# Patient Record
Sex: FEMALE | Race: WHITE | NOT HISPANIC OR LATINO | Employment: FULL TIME | URBAN - NONMETROPOLITAN AREA
[De-identification: names, ages, dates, MRNs, and addresses within clinical notes are randomized per-mention and may not be internally consistent; named-entity substitution may affect disease eponyms.]

---

## 2019-10-09 ENCOUNTER — HISTORICAL (OUTPATIENT)
Dept: ADMINISTRATIVE | Facility: HOSPITAL | Age: 44
End: 2019-10-09

## 2020-07-09 ENCOUNTER — HISTORICAL (OUTPATIENT)
Dept: ADMINISTRATIVE | Facility: HOSPITAL | Age: 45
End: 2020-07-09

## 2020-07-12 LAB — FINAL CULTURE: NORMAL

## 2021-04-09 ENCOUNTER — HISTORICAL (OUTPATIENT)
Dept: ADMINISTRATIVE | Facility: HOSPITAL | Age: 46
End: 2021-04-09

## 2022-04-10 ENCOUNTER — HISTORICAL (OUTPATIENT)
Dept: ADMINISTRATIVE | Facility: HOSPITAL | Age: 47
End: 2022-04-10

## 2022-04-24 VITALS
OXYGEN SATURATION: 98 % | WEIGHT: 174 LBS | DIASTOLIC BLOOD PRESSURE: 107 MMHG | HEIGHT: 65 IN | BODY MASS INDEX: 28.99 KG/M2 | SYSTOLIC BLOOD PRESSURE: 179 MMHG

## 2022-05-07 ENCOUNTER — HISTORICAL (OUTPATIENT)
Dept: ADMINISTRATIVE | Facility: HOSPITAL | Age: 47
End: 2022-05-07

## 2022-05-11 ENCOUNTER — HOSPITAL ENCOUNTER (EMERGENCY)
Facility: HOSPITAL | Age: 47
Discharge: HOME | End: 2022-05-12
Attending: EMERGENCY MEDICINE
Payer: COMMERCIAL

## 2022-05-11 VITALS
SYSTOLIC BLOOD PRESSURE: 179 MMHG | DIASTOLIC BLOOD PRESSURE: 103 MMHG | HEART RATE: 92 BPM | RESPIRATION RATE: 20 BRPM | TEMPERATURE: 98 F | OXYGEN SATURATION: 99 %

## 2022-05-11 DIAGNOSIS — R10.9 ABDOMINAL PAIN: ICD-10-CM

## 2022-05-11 DIAGNOSIS — R11.10 VOMITING, INTRACTABILITY OF VOMITING NOT SPECIFIED, PRESENCE OF NAUSEA NOT SPECIFIED, UNSPECIFIED VOMITING TYPE: ICD-10-CM

## 2022-05-11 DIAGNOSIS — K29.00 ACUTE GASTRITIS, PRESENCE OF BLEEDING UNSPECIFIED, UNSPECIFIED GASTRITIS TYPE: Primary | ICD-10-CM

## 2022-05-11 LAB
ALBUMIN SERPL-MCNC: 4.5 GM/DL (ref 3.5–5)
ALBUMIN/GLOB SERPL: 1.6 RATIO (ref 1.1–2)
ALP SERPL-CCNC: 64 UNIT/L (ref 40–150)
ALT SERPL-CCNC: 17 UNIT/L (ref 0–55)
APPEARANCE UR: CLEAR
AST SERPL-CCNC: 15 UNIT/L (ref 5–34)
B-HCG SERPL QL: NEGATIVE
BACTERIA #/AREA URNS AUTO: ABNORMAL /HPF
BASOPHILS # BLD AUTO: 0.03 X10(3)/MCL (ref 0–0.2)
BASOPHILS NFR BLD AUTO: 0.2 %
BILIRUB UR QL STRIP.AUTO: NEGATIVE MG/DL
BILIRUBIN DIRECT+TOT PNL SERPL-MCNC: 0.4 MG/DL (ref 0–0.5)
BILIRUBIN DIRECT+TOT PNL SERPL-MCNC: 0.8 MG/DL (ref 0–0.8)
BILIRUBIN DIRECT+TOT PNL SERPL-MCNC: 1.2 MG/DL
BUN SERPL-MCNC: 12.2 MG/DL (ref 7–18.7)
CALCIUM SERPL-MCNC: 9.9 MG/DL (ref 8.4–10.2)
CAOX CRY URNS QL MICRO: ABNORMAL /HPF
CHLORIDE SERPL-SCNC: 92 MMOL/L (ref 98–107)
CO2 SERPL-SCNC: 25 MMOL/L (ref 22–29)
COLOR UR AUTO: YELLOW
CREAT SERPL-MCNC: 0.65 MG/DL (ref 0.55–1.02)
EOSINOPHIL # BLD AUTO: 0 X10(3)/MCL (ref 0–0.9)
EOSINOPHIL NFR BLD AUTO: 0 %
ERYTHROCYTE [DISTWIDTH] IN BLOOD BY AUTOMATED COUNT: 12.4 % (ref 11.5–17)
GLOBULIN SER-MCNC: 2.8 GM/DL (ref 2.4–3.5)
GLUCOSE SERPL-MCNC: 97 MG/DL (ref 74–100)
GLUCOSE UR QL STRIP.AUTO: NEGATIVE MG/DL
HCT VFR BLD AUTO: 44.5 % (ref 37–47)
HGB BLD-MCNC: 15.4 GM/DL (ref 12–16)
IMM GRANULOCYTES # BLD AUTO: 0.08 X10(3)/MCL (ref 0–0.02)
IMM GRANULOCYTES NFR BLD AUTO: 0.5 % (ref 0–0.43)
KETONES UR QL STRIP.AUTO: ABNORMAL MG/DL
LEUKOCYTE ESTERASE UR QL STRIP.AUTO: NEGATIVE UNIT/L
LIPASE SERPL-CCNC: 28 U/L
LYMPHOCYTES # BLD AUTO: 2.32 X10(3)/MCL (ref 0.6–4.6)
LYMPHOCYTES NFR BLD AUTO: 14.7 %
MCH RBC QN AUTO: 30.3 PG (ref 27–31)
MCHC RBC AUTO-ENTMCNC: 34.6 MG/DL (ref 33–36)
MCV RBC AUTO: 87.4 FL (ref 80–94)
MONOCYTES # BLD AUTO: 1.25 X10(3)/MCL (ref 0.1–1.3)
MONOCYTES NFR BLD AUTO: 7.9 %
NEUTROPHILS # BLD AUTO: 12.1 X10(3)/MCL (ref 2.1–9.2)
NEUTROPHILS NFR BLD AUTO: 76.7 %
NITRITE UR QL STRIP.AUTO: NEGATIVE
NRBC BLD AUTO-RTO: 0 %
PH UR STRIP.AUTO: 7.5 [PH]
PLATELET # BLD AUTO: 516 X10(3)/MCL (ref 130–400)
PMV BLD AUTO: 8.6 FL (ref 9.4–12.4)
POTASSIUM SERPL-SCNC: 3.3 MMOL/L (ref 3.5–5.1)
PROT SERPL-MCNC: 7.3 GM/DL (ref 6.4–8.3)
PROT UR QL STRIP.AUTO: ABNORMAL MG/DL
RBC # BLD AUTO: 5.09 X10(6)/MCL (ref 4.2–5.4)
RBC #/AREA URNS AUTO: ABNORMAL /HPF
RBC UR QL AUTO: NEGATIVE UNIT/L
SODIUM SERPL-SCNC: 130 MMOL/L (ref 136–145)
SP GR UR STRIP.AUTO: 1.01 (ref 1–1.03)
SQUAMOUS #/AREA URNS AUTO: ABNORMAL /LPF
UROBILINOGEN UR STRIP-ACNC: 1 MG/DL
WBC # SPEC AUTO: 15.8 X10(3)/MCL (ref 4.5–11.5)
WBC #/AREA URNS AUTO: ABNORMAL /HPF
YEAST URNS QL MICRO: ABNORMAL /HPF

## 2022-05-11 PROCEDURE — 25000003 PHARM REV CODE 250: Performed by: EMERGENCY MEDICINE

## 2022-05-11 PROCEDURE — 81015 MICROSCOPIC EXAM OF URINE: CPT | Performed by: STUDENT IN AN ORGANIZED HEALTH CARE EDUCATION/TRAINING PROGRAM

## 2022-05-11 PROCEDURE — 81025 URINE PREGNANCY TEST: CPT | Performed by: EMERGENCY MEDICINE

## 2022-05-11 PROCEDURE — 99285 EMERGENCY DEPT VISIT HI MDM: CPT | Mod: 25

## 2022-05-11 PROCEDURE — 93010 EKG 12-LEAD: ICD-10-PCS | Mod: ,,, | Performed by: INTERNAL MEDICINE

## 2022-05-11 PROCEDURE — 85025 COMPLETE CBC W/AUTO DIFF WBC: CPT | Performed by: STUDENT IN AN ORGANIZED HEALTH CARE EDUCATION/TRAINING PROGRAM

## 2022-05-11 PROCEDURE — 83690 ASSAY OF LIPASE: CPT | Performed by: EMERGENCY MEDICINE

## 2022-05-11 PROCEDURE — 93010 ELECTROCARDIOGRAM REPORT: CPT | Mod: ,,, | Performed by: INTERNAL MEDICINE

## 2022-05-11 PROCEDURE — 36415 COLL VENOUS BLD VENIPUNCTURE: CPT | Performed by: STUDENT IN AN ORGANIZED HEALTH CARE EDUCATION/TRAINING PROGRAM

## 2022-05-11 PROCEDURE — 81003 URINALYSIS AUTO W/O SCOPE: CPT | Performed by: STUDENT IN AN ORGANIZED HEALTH CARE EDUCATION/TRAINING PROGRAM

## 2022-05-11 PROCEDURE — 96374 THER/PROPH/DIAG INJ IV PUSH: CPT

## 2022-05-11 PROCEDURE — 96372 THER/PROPH/DIAG INJ SC/IM: CPT | Performed by: EMERGENCY MEDICINE

## 2022-05-11 PROCEDURE — 80053 COMPREHEN METABOLIC PANEL: CPT | Performed by: STUDENT IN AN ORGANIZED HEALTH CARE EDUCATION/TRAINING PROGRAM

## 2022-05-11 PROCEDURE — 63600175 PHARM REV CODE 636 W HCPCS: Performed by: EMERGENCY MEDICINE

## 2022-05-11 PROCEDURE — C9113 INJ PANTOPRAZOLE SODIUM, VIA: HCPCS | Performed by: EMERGENCY MEDICINE

## 2022-05-11 PROCEDURE — 93005 ELECTROCARDIOGRAM TRACING: CPT

## 2022-05-11 RX ORDER — ESOMEPRAZOLE MAGNESIUM 40 MG/1
40 CAPSULE, DELAYED RELEASE ORAL DAILY
Qty: 30 CAPSULE | Refills: 0 | Status: SHIPPED | OUTPATIENT
Start: 2022-05-11 | End: 2023-05-11

## 2022-05-11 RX ORDER — ONDANSETRON 8 MG/1
8 TABLET, ORALLY DISINTEGRATING ORAL EVERY 6 HOURS PRN
Qty: 12 TABLET | Refills: 1 | Status: SHIPPED | OUTPATIENT
Start: 2022-05-11

## 2022-05-11 RX ORDER — PANTOPRAZOLE SODIUM 40 MG/10ML
40 INJECTION, POWDER, LYOPHILIZED, FOR SOLUTION INTRAVENOUS
Status: COMPLETED | OUTPATIENT
Start: 2022-05-11 | End: 2022-05-11

## 2022-05-11 RX ORDER — ACETAMINOPHEN 500 MG
1000 TABLET ORAL
Status: COMPLETED | OUTPATIENT
Start: 2022-05-11 | End: 2022-05-11

## 2022-05-11 RX ORDER — DICYCLOMINE HYDROCHLORIDE 10 MG/ML
20 INJECTION INTRAMUSCULAR
Status: COMPLETED | OUTPATIENT
Start: 2022-05-11 | End: 2022-05-11

## 2022-05-11 RX ORDER — SUCRALFATE 1 G/1
1 TABLET ORAL 4 TIMES DAILY
Qty: 40 TABLET | Refills: 0 | Status: SHIPPED | OUTPATIENT
Start: 2022-05-11 | End: 2022-05-21

## 2022-05-11 RX ORDER — SODIUM CHLORIDE 9 MG/ML
1000 INJECTION, SOLUTION INTRAVENOUS
Status: COMPLETED | OUTPATIENT
Start: 2022-05-11 | End: 2022-05-11

## 2022-05-11 RX ADMIN — ACETAMINOPHEN 1000 MG: 500 TABLET ORAL at 05:05

## 2022-05-11 RX ADMIN — DICYCLOMINE HYDROCHLORIDE 20 MG: 20 INJECTION, SOLUTION INTRAMUSCULAR at 11:05

## 2022-05-11 RX ADMIN — PANTOPRAZOLE SODIUM 40 MG: 40 INJECTION, POWDER, FOR SOLUTION INTRAVENOUS at 11:05

## 2022-05-11 RX ADMIN — SODIUM CHLORIDE 1000 ML: 9 INJECTION, SOLUTION INTRAVENOUS at 11:05

## 2022-05-11 NOTE — ED PROVIDER NOTES
Encounter Date: 5/11/2022       History     Chief Complaint   Patient presents with    Abdominal Pain     Pt to ER via POV for abd pain.  Pt states also n/v.  Pt states this is her 3rd visit to the ER this week for the same complaint (seen twice in [place]).  Has appt with GI 5/20.  Pt very anxious, crying in triage    Nausea    Vomiting     This patient presents with a one-week history of upper abdominal pain, with associated nausea vomiting.  Over the last week she has had 3-4 episodes of this pain with associated nausea vomiting that lasted several hours.  She has been to an outside emergency department twice and follow up with her primary care physician.  She had similar symptoms several years ago, was diagnosed with gastritis, and improved with symptomatic treatment.  She states she has a decreased appetite as well because of the persistent nausea vomiting and pain.  Patient states that after a while the pain radiates into her lower chest after vomiting.  Denies bloody emesis.  Had a bowel movement yesterday.  At the outside emergency department had ultrasound, along with x-rays and labs.  Patient placed on antibiotics.  Patient has GI follow-up but not for another 10 days.    The history is provided by the patient.   Abdominal Pain  The current episode started several days ago. The onset of the illness was abrupt. The problem has not changed since onset.The abdominal pain is located in the epigastric region. The abdominal pain radiates to the LLQ, LUQ, RUQ and RLQ. The severity of the abdominal pain is 7/10. The abdominal pain is relieved by nothing. The other symptoms of the illness include nausea and vomiting. The other symptoms of the illness do not include fever, shortness of breath or dysuria.   Nausea began 6 to 7 days ago. The nausea is associated with eating.   The vomiting began more than 2 days ago. The emesis contains stomach contents.   The patient states that she believes she is currently not  pregnant. The patient has not had a change in bowel habit. Additional symptoms associated with the illness include anorexia. Symptoms associated with the illness do not include chills, constipation, hematuria, frequency or back pain.   Nausea  Associated symptoms include abdominal pain. Pertinent negatives include no chest pain and no shortness of breath.   Vomiting   Associated symptoms include abdominal pain. Pertinent negatives include no chills and no fever.     Review of patient's allergies indicates:  No Known Allergies  Past Medical History:   Diagnosis Date    Fibromyalgia syndrome      Past Surgical History:   Procedure Laterality Date    SINUS SURGERY       History reviewed. No pertinent family history.     Review of Systems   Constitutional: Negative for chills and fever.   HENT: Negative for sore throat.    Respiratory: Negative for shortness of breath.    Cardiovascular: Negative for chest pain.   Gastrointestinal: Positive for abdominal pain, anorexia, nausea and vomiting. Negative for constipation.   Genitourinary: Negative for dysuria, frequency and hematuria.   Musculoskeletal: Negative for back pain.   Skin: Negative for rash.   Neurological: Negative for weakness.   Hematological: Does not bruise/bleed easily.       Physical Exam     Initial Vitals [05/11/22 0806]   BP Pulse Resp Temp SpO2   (!) 163/95 (!) 121 20 98.4 °F (36.9 °C) 96 %      MAP       --         Physical Exam    Nursing note and vitals reviewed.  Constitutional: She appears well-developed and well-nourished.   HENT:   Head: Normocephalic and atraumatic.   Mouth/Throat: Oropharynx is clear and moist.   Eyes: Pupils are equal, round, and reactive to light.   Neck: Neck supple.   Normal range of motion.  Cardiovascular: Regular rhythm, normal heart sounds and intact distal pulses.   Tachycardic   Pulmonary/Chest: Breath sounds normal.   Abdominal: Abdomen is soft. Bowel sounds are normal. There is no abdominal tenderness. There is  no rebound.   Musculoskeletal:         General: No tenderness. Normal range of motion.      Cervical back: Normal range of motion and neck supple.     Neurological: She is alert and oriented to person, place, and time. She has normal strength. No sensory deficit. GCS score is 15. GCS eye subscore is 4. GCS verbal subscore is 5. GCS motor subscore is 6.   Skin: Skin is warm and dry.   Psychiatric: She has a normal mood and affect.         ED Course   Procedures  Labs Reviewed   COMPREHENSIVE METABOLIC PANEL - Abnormal; Notable for the following components:       Result Value    Sodium Level 130 (*)     Potassium Level 3.3 (*)     Chloride 92 (*)     All other components within normal limits   URINALYSIS, REFLEX TO URINE CULTURE - Abnormal; Notable for the following components:    Protein, UA Trace (*)     Ketones, UA 2+ (*)     All other components within normal limits   CBC WITH DIFFERENTIAL - Abnormal; Notable for the following components:    WBC 15.8 (*)     Platelet 516 (*)     MPV 8.6 (*)     Neut # 12.1 (*)     IG# 0.08 (*)     IG% 0.5 (*)     All other components within normal limits   URINALYSIS, MICROSCOPIC - Abnormal; Notable for the following components:    Yeast, UA Moderate (*)     Calcium Oxalate Crystals, UA Moderate (*)     All other components within normal limits   LIPASE - Normal   HCG QUALITATIVE URINE - Normal   CBC W/ AUTO DIFFERENTIAL    Narrative:     The following orders were created for panel order CBC auto differential.  Procedure                               Abnormality         Status                     ---------                               -----------         ------                     CBC with Differential[[id number]]        Abnormal            Final result                 Please view results for these tests on the individual orders.     EKG Readings: (Independently Interpreted)   Initial Reading: No STEMI. Rhythm: Normal Sinus Rhythm. Heart Rate: 78. Ectopy: No Ectopy. ST Segments:  Normal ST Segments. T Waves: Normal. Clinical Impression: Left Ventricular Hypertrophy (LDH)   Time 11:40 a.m.     ECG Results          EKG 12-lead (In process)  Result time 05/11/22 11:49:43    In process by Interface, Lab In [facility] (05/11/22 11:49:43)                 Narrative:    Test Reason : R10.9,    Vent. Rate : 081 BPM     Atrial Rate : 081 BPM     P-R Int : 134 ms          QRS Dur : 072 ms      QT Int : 422 ms       P-R-T Axes : 016 052 063 degrees     QTc Int : 490 ms    Normal sinus rhythm  Minimal voltage criteria for LVH, may be normal variant ( Sokolow-Gresham )  Prolonged QT  Abnormal ECG  No previous ECGs available    Referred By: DEONDRE NICOLE           Confirmed By:                             Imaging Results          CT Abdomen Pelvis  Without Contrast (Final result)  Result time 05/11/22 16:29:46    Final result by Emmanuel Garcia MD (05/11/22 16:29:46)                 Impression:      1. No acute inflammatory abdominopelvic pathology.  No mechanical bowel obstruction.  No nephroureterolithiasis or hydroureteronephrosis.  2. Nonacute and incidental secondary findings, as detailed above.      Electronically signed by: Emmanuel Garcia  Date:    05/11/2022  Time:    16:29             Narrative:    EXAMINATION:  CT ABDOMEN PELVIS WITHOUT CONTRAST    CLINICAL HISTORY:  Abdominal pain, acute, nonlocalized;    TECHNIQUE:  Multiple axial CT images were obtained from the lung bases through the symphysis pubis without the administration of intravenous contrast and reformatted in the coronal and sagittal planes. Total  mGy*cm. Automated exposure control was utilized for this examination as a radiation dose lowering technique.    COMPARISON:  None available.    FINDINGS:  Inferior heart size is normal.  No pericardial effusion.    The imaged lung bases are without focal consolidation, suspicious mass or nodule, pleural effusion, or pneumothorax.    The liver is normal in size and attenuation.  No focal  suspicious hepatic mass lesions are identified on this noncontrast examination.  No intrahepatic/extrahepatic biliary ductal dilatation.  No radiopaque cholelithiasis or pericholecystic inflammation.  No peripancreatic inflammation or pancreatic ductal dilatation.  Focal punctate calcification within the pancreatic uncinate process.  The spleen is within normal limits.    The adrenal glands are without suspicious mass or nodule.    Mild renal size discrepancy, left larger than right.  No suspicious cystic or solid renal mass lesions are identified on this noncontrast examination.  No nephroureterolithiasis, hydroureteronephrosis, or perinephric/periureteral inflammation.  The bilateral ureters are unobstructed to a moderately distended urinary bladder, which is without suspicious wall thickening or intraluminal mass lesion.    No suspicious adnexal mass lesions are identified on this noncontrast examination.  Foreign body within the vaginal canal is presumed represent a pessary.  The uterus, adnexa, vagina, and perineum are otherwise within normal limits.    The small and large bowel are without mechanical obstruction or focal inflammation.  The appendix and terminal ileum are normal in appearance.  The small bowel is nondilated.  No pathologically enlarged lymph nodes are identified within the abdomen and pelvis on this noncontrast examination.  No free fluid or free air.    The abdominopelvic arteriovascular structures are nonaneurysmal.    Foci of subcutaneous soft tissue gas within the left flank are presumed to represent sites of medication injection.  The soft tissues are otherwise without acute process.    The osseous structures are without acute fracture, dislocation, or aggressive appearing bone lesion.  Minimal lower thoracic and lumbosacral spondylosis.  Bilateral L5 pars interarticularis defects without associated spondylolisthesis.                                 Medications   0.9%  NaCl infusion (1,000  mLs Intravenous New Bag 5/11/22 1125)   pantoprazole injection 40 mg (40 mg Intravenous Given 5/11/22 1125)   dicyclomine injection 20 mg (20 mg Intramuscular Given 5/11/22 1129)   acetaminophen tablet 1,000 mg (1,000 mg Oral Given 5/11/22 1745)     Medical Decision Making:   History:   Old Medical Records: I decided to obtain old medical records.  Initial Assessment:   Patient's abdomen is soft and nontender.  She states this feels similar to the symptoms in 2018 where she was diagnosed with gastritis.  She is only treated with antiemetics, this episode.  She states the last time this occurred she improved after the 1 visit to the emergency department.  Differential Diagnosis:   Gastritis, peptic ulcer disease, pancreatitis, biliary colic, ileus, medication reaction             ED Course as of 05/11/22 1738   Wed May 11, 2022   1731 Patient with above workup, except for mild leukocytosis otherwise unremarkable.  Patient feels better after medications in the ED.  She again states that this is very similar symptoms to which she had previously that she responded very well to outpatient management.  She understands the need to return if any worsening or change in her symptoms.  She does have GI follow-up as well. [MP]      ED Course User Index  [MP] Victorino Martinez MD             Clinical Impression:   Final diagnoses:  [R10.9] Abdominal pain  [K29.00] Acute gastritis, presence of bleeding unspecified, unspecified gastritis type (Primary)  [R11.10] Vomiting, intractability of vomiting not specified, presence of nausea not specified, unspecified vomiting type          ED Disposition Condition    Discharge Stable        ED Prescriptions     Medication Sig Dispense Start Date End Date Auth. Provider    esomeprazole (NEXIUM) 40 MG capsule Take 1 capsule (40 mg total) by mouth once daily. 30 capsule 5/11/2022 5/11/2023 Victorino Martinez MD    sucralfate (CARAFATE) 1 gram tablet Take 1 tablet (1 g total) by mouth 4  (four) times daily. for 10 days 40 tablet 5/11/2022 5/21/2022 Victorino Martinez MD    ondansetron (ZOFRAN-ODT) 8 MG TbDL Take 1 tablet (8 mg total) by mouth every 6 (six) hours as needed (nausea/vomiting). 12 tablet 5/11/2022  Victorino Martinez MD        Follow-up Information     Follow up With Specialties Details Why Contact Info    Devendra Grace MD Gastroenterology  Keep scheduled appointment [address].  [facility] [id number]  [phone]      [facility] - Emergency Dept Emergency Medicine Go to  If symptoms worsen, As needed [address] [facility] [place] [id number] [phone]           Victorino Martinez MD  05/11/22 3572

## 2022-05-12 ENCOUNTER — HOSPITAL ENCOUNTER (EMERGENCY)
Facility: HOSPITAL | Age: 47
Discharge: HOME | End: 2022-05-12
Attending: STUDENT IN AN ORGANIZED HEALTH CARE EDUCATION/TRAINING PROGRAM
Payer: COMMERCIAL

## 2022-05-12 VITALS
TEMPERATURE: 98 F | RESPIRATION RATE: 23 BRPM | HEIGHT: 65 IN | DIASTOLIC BLOOD PRESSURE: 82 MMHG | OXYGEN SATURATION: 98 % | WEIGHT: 165 LBS | BODY MASS INDEX: 27.49 KG/M2 | SYSTOLIC BLOOD PRESSURE: 158 MMHG | HEART RATE: 89 BPM

## 2022-05-12 DIAGNOSIS — R11.2 NON-INTRACTABLE VOMITING WITH NAUSEA, UNSPECIFIED VOMITING TYPE: Primary | ICD-10-CM

## 2022-05-12 LAB
ALBUMIN SERPL-MCNC: 4.3 GM/DL (ref 3.5–5)
ALBUMIN/GLOB SERPL: 1.3 RATIO (ref 1.1–2)
ALP SERPL-CCNC: 60 UNIT/L (ref 40–150)
ALT SERPL-CCNC: 18 UNIT/L (ref 0–55)
AST SERPL-CCNC: 19 UNIT/L (ref 5–34)
BASOPHILS # BLD AUTO: 0.04 X10(3)/MCL (ref 0–0.2)
BASOPHILS NFR BLD AUTO: 0.4 %
BILIRUBIN DIRECT+TOT PNL SERPL-MCNC: 1.3 MG/DL
BUN SERPL-MCNC: 7 MG/DL (ref 7–18.7)
CALCIUM SERPL-MCNC: 9.3 MG/DL (ref 8.4–10.2)
CHLORIDE SERPL-SCNC: 96 MMOL/L (ref 98–107)
CO2 SERPL-SCNC: 26 MMOL/L (ref 22–29)
CREAT SERPL-MCNC: 0.67 MG/DL (ref 0.55–1.02)
EOSINOPHIL # BLD AUTO: 0.01 X10(3)/MCL (ref 0–0.9)
EOSINOPHIL NFR BLD AUTO: 0.1 %
ERYTHROCYTE [DISTWIDTH] IN BLOOD BY AUTOMATED COUNT: 12.4 % (ref 11.5–17)
GLOBULIN SER-MCNC: 3.4 GM/DL (ref 2.4–3.5)
GLUCOSE SERPL-MCNC: 99 MG/DL (ref 74–100)
HCT VFR BLD AUTO: 42.8 % (ref 37–47)
HGB BLD-MCNC: 14.9 GM/DL (ref 12–16)
IMM GRANULOCYTES # BLD AUTO: 0.05 X10(3)/MCL (ref 0–0.02)
IMM GRANULOCYTES NFR BLD AUTO: 0.5 % (ref 0–0.43)
LIPASE SERPL-CCNC: 21 U/L
LYMPHOCYTES # BLD AUTO: 2.04 X10(3)/MCL (ref 0.6–4.6)
LYMPHOCYTES NFR BLD AUTO: 18.9 %
MAGNESIUM SERPL-MCNC: 1.6 MG/DL (ref 1.6–2.6)
MCH RBC QN AUTO: 30.5 PG (ref 27–31)
MCHC RBC AUTO-ENTMCNC: 34.8 MG/DL (ref 33–36)
MCV RBC AUTO: 87.7 FL (ref 80–94)
MONOCYTES # BLD AUTO: 1.12 X10(3)/MCL (ref 0.1–1.3)
MONOCYTES NFR BLD AUTO: 10.4 %
NEUTROPHILS # BLD AUTO: 7.5 X10(3)/MCL (ref 2.1–9.2)
NEUTROPHILS NFR BLD AUTO: 69.7 %
NRBC BLD AUTO-RTO: 0 %
PLATELET # BLD AUTO: 474 X10(3)/MCL (ref 130–400)
PMV BLD AUTO: 8.7 FL (ref 9.4–12.4)
POTASSIUM SERPL-SCNC: 2.9 MMOL/L (ref 3.5–5.1)
PROT SERPL-MCNC: 7.7 GM/DL (ref 6.4–8.3)
RBC # BLD AUTO: 4.88 X10(6)/MCL (ref 4.2–5.4)
SODIUM SERPL-SCNC: 135 MMOL/L (ref 136–145)
WBC # SPEC AUTO: 10.8 X10(3)/MCL (ref 4.5–11.5)

## 2022-05-12 PROCEDURE — 96361 HYDRATE IV INFUSION ADD-ON: CPT

## 2022-05-12 PROCEDURE — 80053 COMPREHEN METABOLIC PANEL: CPT | Performed by: NURSE PRACTITIONER

## 2022-05-12 PROCEDURE — 96372 THER/PROPH/DIAG INJ SC/IM: CPT | Mod: 59 | Performed by: NURSE PRACTITIONER

## 2022-05-12 PROCEDURE — 96375 TX/PRO/DX INJ NEW DRUG ADDON: CPT

## 2022-05-12 PROCEDURE — 83690 ASSAY OF LIPASE: CPT | Performed by: NURSE PRACTITIONER

## 2022-05-12 PROCEDURE — 63600175 PHARM REV CODE 636 W HCPCS: Performed by: PHYSICIAN ASSISTANT

## 2022-05-12 PROCEDURE — 36415 COLL VENOUS BLD VENIPUNCTURE: CPT | Performed by: NURSE PRACTITIONER

## 2022-05-12 PROCEDURE — 85025 COMPLETE CBC W/AUTO DIFF WBC: CPT | Performed by: NURSE PRACTITIONER

## 2022-05-12 PROCEDURE — 63600175 PHARM REV CODE 636 W HCPCS: Performed by: NURSE PRACTITIONER

## 2022-05-12 PROCEDURE — 96366 THER/PROPH/DIAG IV INF ADDON: CPT

## 2022-05-12 PROCEDURE — 96365 THER/PROPH/DIAG IV INF INIT: CPT

## 2022-05-12 PROCEDURE — 83735 ASSAY OF MAGNESIUM: CPT | Performed by: PHYSICIAN ASSISTANT

## 2022-05-12 PROCEDURE — 99284 EMERGENCY DEPT VISIT MOD MDM: CPT | Mod: 25

## 2022-05-12 PROCEDURE — 25000003 PHARM REV CODE 250: Performed by: PHYSICIAN ASSISTANT

## 2022-05-12 PROCEDURE — C9113 INJ PANTOPRAZOLE SODIUM, VIA: HCPCS | Performed by: PHYSICIAN ASSISTANT

## 2022-05-12 RX ORDER — MAGNESIUM SULFATE HEPTAHYDRATE 40 MG/ML
2 INJECTION, SOLUTION INTRAVENOUS
Status: COMPLETED | OUTPATIENT
Start: 2022-05-12 | End: 2022-05-12

## 2022-05-12 RX ORDER — PROMETHAZINE HYDROCHLORIDE 25 MG/1
25 SUPPOSITORY RECTAL EVERY 6 HOURS PRN
Qty: 15 SUPPOSITORY | Refills: 0 | Status: SHIPPED | OUTPATIENT
Start: 2022-05-12 | End: 2023-06-03 | Stop reason: SDUPTHER

## 2022-05-12 RX ORDER — ONDANSETRON 2 MG/ML
4 INJECTION INTRAMUSCULAR; INTRAVENOUS ONCE
Status: DISCONTINUED | OUTPATIENT
Start: 2022-05-12 | End: 2022-05-12

## 2022-05-12 RX ORDER — DICYCLOMINE HYDROCHLORIDE 10 MG/ML
20 INJECTION INTRAMUSCULAR ONCE
Status: COMPLETED | OUTPATIENT
Start: 2022-05-12 | End: 2022-05-12

## 2022-05-12 RX ORDER — PANTOPRAZOLE SODIUM 40 MG/10ML
40 INJECTION, POWDER, LYOPHILIZED, FOR SOLUTION INTRAVENOUS
Status: COMPLETED | OUTPATIENT
Start: 2022-05-12 | End: 2022-05-12

## 2022-05-12 RX ORDER — DROPERIDOL 2.5 MG/ML
1.25 INJECTION, SOLUTION INTRAMUSCULAR; INTRAVENOUS ONCE
Status: COMPLETED | OUTPATIENT
Start: 2022-05-12 | End: 2022-05-12

## 2022-05-12 RX ORDER — METOCLOPRAMIDE 10 MG/1
10 TABLET ORAL EVERY 6 HOURS
Qty: 30 TABLET | Refills: 0 | Status: SHIPPED | OUTPATIENT
Start: 2022-05-12

## 2022-05-12 RX ORDER — POTASSIUM CHLORIDE 20 MEQ/1
20 TABLET, EXTENDED RELEASE ORAL 2 TIMES DAILY
Qty: 6 TABLET | Refills: 0 | Status: SHIPPED | OUTPATIENT
Start: 2022-05-12 | End: 2022-05-15

## 2022-05-12 RX ORDER — POTASSIUM CHLORIDE 20 MEQ/1
20 TABLET, EXTENDED RELEASE ORAL
Status: COMPLETED | OUTPATIENT
Start: 2022-05-12 | End: 2022-05-12

## 2022-05-12 RX ADMIN — SODIUM CHLORIDE, POTASSIUM CHLORIDE, SODIUM LACTATE AND CALCIUM CHLORIDE 1000 ML: 600; 310; 30; 20 INJECTION, SOLUTION INTRAVENOUS at 01:05

## 2022-05-12 RX ADMIN — PANTOPRAZOLE SODIUM 40 MG: 40 INJECTION, POWDER, FOR SOLUTION INTRAVENOUS at 03:05

## 2022-05-12 RX ADMIN — MAGNESIUM SULFATE HEPTAHYDRATE 2 G: 40 INJECTION, SOLUTION INTRAVENOUS at 05:05

## 2022-05-12 RX ADMIN — POTASSIUM CHLORIDE 20 MEQ: 20 TABLET, EXTENDED RELEASE ORAL at 03:05

## 2022-05-12 RX ADMIN — DROPERIDOL 1.25 MG: 2.5 INJECTION, SOLUTION INTRAMUSCULAR; INTRAVENOUS at 01:05

## 2022-05-12 RX ADMIN — DICYCLOMINE HYDROCHLORIDE 20 MG: 20 INJECTION, SOLUTION INTRAMUSCULAR at 01:05

## 2022-05-12 NOTE — ED PROVIDER NOTES
Encounter Date: 5/12/2022       History     Chief Complaint   Patient presents with    Emesis     Pt presents to er c/o n/v x 1 week.  States has had 4 ED visits in the last week.  States taking medications at home but not working.  States can't keep anything down.  Pt also c/o abd pain.  Pt is aaox4 amulated into room with steady gait.      47 yo female with hx of gastritis presents to ED for evaluation of upper abdominal pain with nausea and vomiting for the past week. Patient reports this is her 4th visit to the ED this week. Took Zofran and phenergan at home without relief. Patient reports decrease in appetite. Hx of gastritis, followed by Dr. Grace with GI follow up in 10 days. Seen here yesterday with full workup including negative US and CT scan. Denies any diarrhea or blood in stool.           Review of patient's allergies indicates:  No Known Allergies  Past Medical History:   Diagnosis Date    Fibromyalgia syndrome      Past Surgical History:   Procedure Laterality Date    SINUS SURGERY       History reviewed. No pertinent family history.  Social History     Tobacco Use    Smoking status: Never Smoker    Smokeless tobacco: Never Used   Substance Use Topics    Alcohol use: Not Currently    Drug use: Never     Review of Systems   Constitutional: Negative for chills, fatigue and fever.   Respiratory: Negative for cough and shortness of breath.    Cardiovascular: Negative for chest pain.   Gastrointestinal: Positive for abdominal pain, nausea and vomiting. Negative for blood in stool, constipation and diarrhea.   Genitourinary: Negative for dysuria, flank pain and frequency.   Musculoskeletal: Negative for back pain.   Skin: Negative for rash.   Neurological: Negative for dizziness, weakness, light-headedness and headaches.   Hematological: Does not bruise/bleed easily.       Physical Exam     Initial Vitals [05/12/22 1037]   BP Pulse Resp Temp SpO2   (!) 155/92 (!) 111 17 97.7 °F (36.5 °C) 97 %       MAP       --         Physical Exam    Nursing note and vitals reviewed.  Constitutional: She appears well-developed.   HENT:   Head: Normocephalic and atraumatic.   Eyes: EOM are normal. Pupils are equal, round, and reactive to light.   Neck: Neck supple.   Normal range of motion.  Cardiovascular: Normal rate, regular rhythm and normal heart sounds.   Pulmonary/Chest: Breath sounds normal. She has no wheezes. She has no rhonchi. She has no rales.   Abdominal: Abdomen is soft. Bowel sounds are normal. There is no abdominal tenderness. There is no rebound and no guarding.   Musculoskeletal:         General: Normal range of motion.      Cervical back: Normal range of motion and neck supple.     Neurological: She is alert and oriented to person, place, and time.   Skin: Skin is warm and dry.   Psychiatric: She has a normal mood and affect.         ED Course   Procedures  Labs Reviewed   COMPREHENSIVE METABOLIC PANEL - Abnormal; Notable for the following components:       Result Value    Sodium Level 135 (*)     Potassium Level 2.9 (*)     Chloride 96 (*)     All other components within normal limits   CBC WITH DIFFERENTIAL - Abnormal; Notable for the following components:    Platelet 474 (*)     MPV 8.7 (*)     IG# 0.05 (*)     IG% 0.5 (*)     All other components within normal limits   LIPASE - Normal   MAGNESIUM - Normal   CBC W/ AUTO DIFFERENTIAL    Narrative:     The following orders were created for panel order CBC Auto Differential.  Procedure                               Abnormality         Status                     ---------                               -----------         ------                     CBC with Differential[[id number]]        Abnormal            Final result                 Please view results for these tests on the individual orders.          Imaging Results    None     CT Abdomen Pelvis  Without Contrast    Result Date: 5/11/2022  EXAMINATION: CT ABDOMEN PELVIS WITHOUT CONTRAST CLINICAL  HISTORY: Abdominal pain, acute, nonlocalized; TECHNIQUE: Multiple axial CT images were obtained from the lung bases through the symphysis pubis without the administration of intravenous contrast and reformatted in the coronal and sagittal planes. Total  mGy*cm. Automated exposure control was utilized for this examination as a radiation dose lowering technique. COMPARISON: None available. FINDINGS: Inferior heart size is normal.  No pericardial effusion. The imaged lung bases are without focal consolidation, suspicious mass or nodule, pleural effusion, or pneumothorax. The liver is normal in size and attenuation.  No focal suspicious hepatic mass lesions are identified on this noncontrast examination.  No intrahepatic/extrahepatic biliary ductal dilatation.  No radiopaque cholelithiasis or pericholecystic inflammation.  No peripancreatic inflammation or pancreatic ductal dilatation.  Focal punctate calcification within the pancreatic uncinate process.  The spleen is within normal limits. The adrenal glands are without suspicious mass or nodule. Mild renal size discrepancy, left larger than right.  No suspicious cystic or solid renal mass lesions are identified on this noncontrast examination.  No nephroureterolithiasis, hydroureteronephrosis, or perinephric/periureteral inflammation.  The bilateral ureters are unobstructed to a moderately distended urinary bladder, which is without suspicious wall thickening or intraluminal mass lesion. No suspicious adnexal mass lesions are identified on this noncontrast examination.  Foreign body within the vaginal canal is presumed represent a pessary.  The uterus, adnexa, vagina, and perineum are otherwise within normal limits. The small and large bowel are without mechanical obstruction or focal inflammation.  The appendix and terminal ileum are normal in appearance.  The small bowel is nondilated.  No pathologically enlarged lymph nodes are identified within the abdomen  and pelvis on this noncontrast examination.  No free fluid or free air. The abdominopelvic arteriovascular structures are nonaneurysmal. Foci of subcutaneous soft tissue gas within the left flank are presumed to represent sites of medication injection.  The soft tissues are otherwise without acute process. The osseous structures are without acute fracture, dislocation, or aggressive appearing bone lesion.  Minimal lower thoracic and lumbosacral spondylosis.  Bilateral L5 pars interarticularis defects without associated spondylolisthesis.     1. No acute inflammatory abdominopelvic pathology.  No mechanical bowel obstruction.  No nephroureterolithiasis or hydroureteronephrosis. 2. Nonacute and incidental secondary findings, as detailed above. Electronically signed by: Emmanuel Garcia Date:    05/11/2022 Time:    16:29    Imaging Results    None          Medications   dicyclomine injection 20 mg (20 mg Intramuscular Given 5/12/22 1328)   lactated ringers bolus 1,000 mL (0 mLs Intravenous Stopped 5/12/22 1427)   droperidoL injection 1.25 mg (1.25 mg Intravenous Given 5/12/22 1330)   pantoprazole injection 40 mg (40 mg Intravenous Given 5/12/22 1546)   potassium chloride SA CR tablet 20 mEq (20 mEq Oral Given 5/12/22 1547)   magnesium sulfate 2g in water 50mL IVPB (premix) (0 g Intravenous Stopped 5/12/22 1915)     Medical Decision Making:   Differential Diagnosis:   Differential Diagnosis includes, but is not limited to:  AAA, aortic dissection, mesenteric ischemia, perforated viscous, MI/ACS, SBO/volvulus, incarcerated/strangulated hernia, intussusception, ileus, appendicitis, cholecystitis, cholangitis, diverticulitis, esophagitis, hepatitis, nephrolithiasis, pancreatitis, gastroenteritis, colitis, IBD/IBS, biliary colic, GERD, PUD, constipation, UTI/pyelonephritis,  disorder.               ED Course as of 05/12/22 2152   Thu May 12, 2022   1513 I have independently evaluated the patient.  Please see attestation.  [RP]    1520 discussed case with ASAEL Ramirez hospitalist. Would like to PO challenge for possible discharge. [SL]   1545 Potassium(!): 2.9  Will give PO potassium and magnesium. Patient tolerating fluids at this time. Will give crackers to see if tolerating.  [SL]   1750 Patient feeling better. Tolerating liquids and crackers. No vomiting while in the ED. Giving IV magnesium. Will give reglain, phenergan and potassium. All questions answered, patient verbalizes understanding and agrees with plan of care.  [SL]      ED Course User Index  [RP] Emil Asencio MD  [SL] ADORE Celaya             Clinical Impression:   Final diagnoses:  [R11.2] Non-intractable vomiting with nausea, unspecified vomiting type (Primary)          ED Disposition Condition    Discharge Stable        ED Prescriptions     Medication Sig Dispense Start Date End Date Auth. Provider    metoclopramide HCl (REGLAN) 10 MG tablet Take 1 tablet (10 mg total) by mouth every 6 (six) hours. 30 tablet 5/12/2022  ADORE Celaya    promethazine (PHENERGAN) 25 MG suppository Place 1 suppository (25 mg total) rectally every 6 (six) hours as needed for Nausea. 15 suppository 5/12/2022  ADORE Celaya        Follow-up Information     Follow up With Specialties Details Why Contact Info    PCP  In 1 week As needed            ADORE Celaya  05/12/22 [id number]

## 2022-05-12 NOTE — FIRST PROVIDER EVALUATION
Medical screening exam completed.  I have conducted a focused provider triage encounter, findings are as follows:    Brief history of present illness:  States nausea and vomiting. States abdominal pain.     There were no vitals filed for this visit.    Pertinent physical exam:  Awake, alert, and ambulatory    Brief workup plan:  Labs, meds    Preliminary workup initiated; this workup will be continued and followed by the physician or advanced practice provider that is assigned to the patient when roomed.

## 2022-07-21 ENCOUNTER — HISTORICAL (OUTPATIENT)
Dept: ADMINISTRATIVE | Facility: HOSPITAL | Age: 47
End: 2022-07-21

## 2023-06-03 ENCOUNTER — HOSPITAL ENCOUNTER (EMERGENCY)
Facility: HOSPITAL | Age: 48
Discharge: HOME | End: 2023-06-03
Attending: FAMILY MEDICINE
Payer: COMMERCIAL

## 2023-06-03 VITALS
HEIGHT: 65 IN | SYSTOLIC BLOOD PRESSURE: 168 MMHG | TEMPERATURE: 98 F | BODY MASS INDEX: 26.99 KG/M2 | DIASTOLIC BLOOD PRESSURE: 76 MMHG | HEART RATE: 101 BPM | WEIGHT: 162 LBS | OXYGEN SATURATION: 99 % | RESPIRATION RATE: 20 BRPM

## 2023-06-03 DIAGNOSIS — E87.6 HYPOKALEMIA: Primary | ICD-10-CM

## 2023-06-03 DIAGNOSIS — K29.70 GASTRITIS WITHOUT BLEEDING, UNSPECIFIED CHRONICITY, UNSPECIFIED GASTRITIS TYPE: ICD-10-CM

## 2023-06-03 LAB
ALBUMIN SERPL-MCNC: 4.9 G/DL (ref 3.4–5)
ALBUMIN/GLOB SERPL: 1.6 RATIO
ALP SERPL-CCNC: 81 UNIT/L (ref 50–144)
ALT SERPL-CCNC: 33 UNIT/L (ref 1–45)
ANION GAP SERPL CALC-SCNC: 10 MEQ/L (ref 2–13)
AST SERPL-CCNC: 32 UNIT/L (ref 14–36)
BASOPHILS # BLD AUTO: 0.03 X10(3)/MCL (ref 0.01–0.08)
BASOPHILS NFR BLD AUTO: 0.3 % (ref 0.1–1.2)
BILIRUBIN DIRECT+TOT PNL SERPL-MCNC: 1.1 MG/DL (ref 0–1)
BUN SERPL-MCNC: 20 MG/DL (ref 7–20)
CALCIUM SERPL-MCNC: 9.4 MG/DL (ref 8.4–10.2)
CHLORIDE SERPL-SCNC: 91 MMOL/L (ref 98–110)
CO2 SERPL-SCNC: 30 MMOL/L (ref 21–32)
CREAT SERPL-MCNC: 0.61 MG/DL (ref 0.66–1.25)
CREAT/UREA NIT SERPL: 33 (ref 12–20)
EOSINOPHIL # BLD AUTO: 0.01 X10(3)/MCL (ref 0.04–0.36)
EOSINOPHIL NFR BLD AUTO: 0.1 % (ref 0.7–7)
ERYTHROCYTE [DISTWIDTH] IN BLOOD BY AUTOMATED COUNT: 11.9 % (ref 11–14.5)
GFR SERPLBLD CREATININE-BSD FMLA CKD-EPI: >90 MLS/MIN/1.73/M2
GLOBULIN SER-MCNC: 3.1 GM/DL (ref 2–3.9)
GLUCOSE SERPL-MCNC: 139 MG/DL (ref 70–115)
HCT VFR BLD AUTO: 39.7 % (ref 36–48)
HGB BLD-MCNC: 14.4 G/DL (ref 11.8–16)
IMM GRANULOCYTES # BLD AUTO: 0.06 X10(3)/MCL (ref 0–0.03)
IMM GRANULOCYTES NFR BLD AUTO: 0.5 % (ref 0–0.5)
LIPASE SERPL-CCNC: 109 U/L (ref 23–300)
LYMPHOCYTES # BLD AUTO: 1.04 X10(3)/MCL (ref 1.16–3.74)
LYMPHOCYTES NFR BLD AUTO: 9.2 % (ref 20–55)
MCH RBC QN AUTO: 31.4 PG (ref 27–34)
MCHC RBC AUTO-ENTMCNC: 36.3 G/DL (ref 31–37)
MCV RBC AUTO: 86.7 FL (ref 79–99)
MONOCYTES # BLD AUTO: 0.44 X10(3)/MCL (ref 0.24–0.36)
MONOCYTES NFR BLD AUTO: 3.9 % (ref 4.7–12.5)
NEUTROPHILS # BLD AUTO: 9.71 X10(3)/MCL (ref 1.56–6.13)
NEUTROPHILS NFR BLD AUTO: 86 % (ref 37–73)
NRBC BLD AUTO-RTO: 0 %
PLATELET # BLD AUTO: 403 X10(3)/MCL (ref 140–371)
PMV BLD AUTO: 8.1 FL (ref 9.4–12.4)
POTASSIUM SERPL-SCNC: 3 MMOL/L (ref 3.5–5.1)
PROT SERPL-MCNC: 8 GM/DL (ref 6.3–8.2)
RBC # BLD AUTO: 4.58 X10(6)/MCL (ref 4–5.1)
SODIUM SERPL-SCNC: 131 MMOL/L (ref 135–145)
WBC # SPEC AUTO: 11.29 X10(3)/MCL (ref 4–11.5)

## 2023-06-03 PROCEDURE — 83690 ASSAY OF LIPASE: CPT | Performed by: FAMILY MEDICINE

## 2023-06-03 PROCEDURE — 96375 TX/PRO/DX INJ NEW DRUG ADDON: CPT

## 2023-06-03 PROCEDURE — C9113 INJ PANTOPRAZOLE SODIUM, VIA: HCPCS | Performed by: FAMILY MEDICINE

## 2023-06-03 PROCEDURE — 63600175 PHARM REV CODE 636 W HCPCS: Performed by: FAMILY MEDICINE

## 2023-06-03 PROCEDURE — 85025 COMPLETE CBC W/AUTO DIFF WBC: CPT | Performed by: FAMILY MEDICINE

## 2023-06-03 PROCEDURE — 80053 COMPREHEN METABOLIC PANEL: CPT | Performed by: FAMILY MEDICINE

## 2023-06-03 PROCEDURE — 25000003 PHARM REV CODE 250: Performed by: FAMILY MEDICINE

## 2023-06-03 PROCEDURE — 99284 EMERGENCY DEPT VISIT MOD MDM: CPT | Mod: 25

## 2023-06-03 PROCEDURE — 36415 COLL VENOUS BLD VENIPUNCTURE: CPT | Performed by: FAMILY MEDICINE

## 2023-06-03 PROCEDURE — 96365 THER/PROPH/DIAG IV INF INIT: CPT

## 2023-06-03 RX ORDER — POTASSIUM CHLORIDE 7.45 MG/ML
10 INJECTION INTRAVENOUS
Status: DISCONTINUED | OUTPATIENT
Start: 2023-06-03 | End: 2023-06-03 | Stop reason: HOSPADM

## 2023-06-03 RX ORDER — PANTOPRAZOLE SODIUM 40 MG/10ML
40 INJECTION, POWDER, LYOPHILIZED, FOR SOLUTION INTRAVENOUS
Status: COMPLETED | OUTPATIENT
Start: 2023-06-03 | End: 2023-06-03

## 2023-06-03 RX ORDER — MAG HYDROX/ALUMINUM HYD/SIMETH 200-200-20
30 SUSPENSION, ORAL (FINAL DOSE FORM) ORAL ONCE
Status: COMPLETED | OUTPATIENT
Start: 2023-06-03 | End: 2023-06-03

## 2023-06-03 RX ORDER — HYDRALAZINE HYDROCHLORIDE 20 MG/ML
20 INJECTION INTRAMUSCULAR; INTRAVENOUS
Status: COMPLETED | OUTPATIENT
Start: 2023-06-03 | End: 2023-06-03

## 2023-06-03 RX ORDER — POTASSIUM CHLORIDE 7.45 MG/ML
10 INJECTION INTRAVENOUS
Status: COMPLETED | OUTPATIENT
Start: 2023-06-03 | End: 2023-06-03

## 2023-06-03 RX ORDER — PROMETHAZINE HYDROCHLORIDE 25 MG/1
25 SUPPOSITORY RECTAL EVERY 6 HOURS PRN
Qty: 10 SUPPOSITORY | Refills: 0 | Status: SHIPPED | OUTPATIENT
Start: 2023-06-03

## 2023-06-03 RX ORDER — LIDOCAINE HYDROCHLORIDE 20 MG/ML
15 SOLUTION OROPHARYNGEAL ONCE
Status: COMPLETED | OUTPATIENT
Start: 2023-06-03 | End: 2023-06-03

## 2023-06-03 RX ORDER — SODIUM CHLORIDE 9 MG/ML
1000 INJECTION, SOLUTION INTRAVENOUS
Status: COMPLETED | OUTPATIENT
Start: 2023-06-03 | End: 2023-06-03

## 2023-06-03 RX ORDER — LORAZEPAM 2 MG/ML
1 INJECTION INTRAMUSCULAR
Status: COMPLETED | OUTPATIENT
Start: 2023-06-03 | End: 2023-06-03

## 2023-06-03 RX ORDER — PROCHLORPERAZINE EDISYLATE 5 MG/ML
2.5 INJECTION INTRAMUSCULAR; INTRAVENOUS
Status: DISCONTINUED | OUTPATIENT
Start: 2023-06-03 | End: 2023-06-03

## 2023-06-03 RX ORDER — PROCHLORPERAZINE EDISYLATE 5 MG/ML
10 INJECTION INTRAMUSCULAR; INTRAVENOUS
Status: COMPLETED | OUTPATIENT
Start: 2023-06-03 | End: 2023-06-03

## 2023-06-03 RX ORDER — PROMETHAZINE HYDROCHLORIDE 25 MG/1
25 SUPPOSITORY RECTAL EVERY 6 HOURS PRN
Qty: 15 SUPPOSITORY | Refills: 0 | Status: SHIPPED | OUTPATIENT
Start: 2023-06-03

## 2023-06-03 RX ORDER — ONDANSETRON 2 MG/ML
8 INJECTION INTRAMUSCULAR; INTRAVENOUS
Status: COMPLETED | OUTPATIENT
Start: 2023-06-03 | End: 2023-06-03

## 2023-06-03 RX ADMIN — LORAZEPAM 1 MG: 2 INJECTION INTRAMUSCULAR; INTRAVENOUS at 09:06

## 2023-06-03 RX ADMIN — PROCHLORPERAZINE EDISYLATE 10 MG: 5 INJECTION INTRAMUSCULAR; INTRAVENOUS at 09:06

## 2023-06-03 RX ADMIN — POTASSIUM CHLORIDE 10 MEQ: 7.46 INJECTION, SOLUTION INTRAVENOUS at 07:06

## 2023-06-03 RX ADMIN — ONDANSETRON 8 MG: 2 INJECTION INTRAMUSCULAR; INTRAVENOUS at 08:06

## 2023-06-03 RX ADMIN — POTASSIUM CHLORIDE 10 MEQ: 7.46 INJECTION, SOLUTION INTRAVENOUS at 08:06

## 2023-06-03 RX ADMIN — ALUMINUM HYDROXIDE, MAGNESIUM HYDROXIDE, AND SIMETHICONE 30 ML: 200; 200; 20 SUSPENSION ORAL at 07:06

## 2023-06-03 RX ADMIN — LIDOCAINE HYDROCHLORIDE 15 ML: 20 SOLUTION ORAL at 07:06

## 2023-06-03 RX ADMIN — SODIUM CHLORIDE 1000 ML: 9 INJECTION, SOLUTION INTRAVENOUS at 07:06

## 2023-06-03 RX ADMIN — PANTOPRAZOLE SODIUM 40 MG: 40 INJECTION, POWDER, FOR SOLUTION INTRAVENOUS at 07:06

## 2023-06-03 RX ADMIN — HYDRALAZINE HYDROCHLORIDE 20 MG: 20 INJECTION INTRAMUSCULAR; INTRAVENOUS at 08:06

## 2023-06-03 NOTE — ED PROVIDER NOTES
Encounter Date: 6/3/2023       History     Chief Complaint   Patient presents with    Abdominal Pain     Patient complaining epigastric pain x3 days. Patient states she has hx of gastritis. Vomiting all night long     47-year-old white female presents to the emergency room complaining of severe gastritis pain off and on for 3 days much worse since last night.  Patient states he has a associated nausea vomiting but no diarrhea or voiding complaints.  Patient states he has a history of recurrent gastritis usually made better by GI cocktail.  Patient says she has had multiple workups including EGDs but the only diagnosis has been gastritis.  Patient denies any fever.  Patient states the pain got worse last night after eating fish.    The history is provided by the patient.   Review of patient's allergies indicates:  No Known Allergies  Past Medical History:   Diagnosis Date    Fibromyalgia syndrome     Gastro-esophageal reflux disease without esophagitis      Past Surgical History:   Procedure Laterality Date    SINUS SURGERY       History reviewed. No pertinent family history.  Social History     Tobacco Use    Smoking status: Never    Smokeless tobacco: Never   Substance Use Topics    Alcohol use: Yes    Drug use: Never     Review of Systems   Gastrointestinal:  Positive for abdominal pain, nausea and vomiting.   All other systems reviewed and are negative.    Physical Exam     Initial Vitals [06/03/23 0652]   BP Pulse Resp Temp SpO2   (!) 161/134 102 18 98 °F (36.7 °C) 96 %      MAP       --         Physical Exam    Nursing note and vitals reviewed.  Constitutional:   Well-developed well-nourished white female alert in no acute distress.   HENT:   Head is atraumatic and normocephalic.  Oropharynx is clear moist mucous membranes.  Nose is clear with no discharge.   Neck:   Neck is supple with full range of motion with no cervical lymphadenopathy.   Cardiovascular:            Heart is regular rate and rhythm but  tachycardic with no murmur.   Pulmonary/Chest:   Lungs are clear to auscultation bilaterally.   Abdominal:   Abdomen positive bowel sounds throughout.  Tenderness noted to epigastric region with mild guarding with no rebound.   Musculoskeletal:      Comments: Extremities with full range of motion throughout with no clubbing cyanosis or edema.     Neurological:   Patient is alert and oriented x3.  Bilateral upper and lower extremities with normal sensation and strength.   Skin:   Skin is warm and dry.   Psychiatric: She has a normal mood and affect. Her behavior is normal. Thought content normal.       ED Course   Procedures  Labs Reviewed   COMPREHENSIVE METABOLIC PANEL - Abnormal; Notable for the following components:       Result Value    Sodium Level 131 (*)     Potassium Level 3.0 (*)     Chloride 91 (*)     Glucose Level 139 (*)     Creatinine 0.61 (*)     Bilirubin Total 1.1 (*)     BUN/Creatinine Ratio 33 (*)     All other components within normal limits   CBC WITH DIFFERENTIAL - Abnormal; Notable for the following components:    Platelet 403 (*)     MPV 8.1 (*)     Neut % 86.0 (*)     Lymph % 9.2 (*)     Mono % 3.9 (*)     Eos % 0.1 (*)     Lymph # 1.04 (*)     Neut # 9.71 (*)     Mono # 0.44 (*)     Eos # 0.01 (*)     IG# 0.06 (*)     All other components within normal limits   LIPASE - Normal   CBC W/ AUTO DIFFERENTIAL    Narrative:     The following orders were created for panel order CBC auto differential.  Procedure                               Abnormality         Status                     ---------                               -----------         ------                     CBC with Differential[[id number]]        Abnormal            Final result                 Please view results for these tests on the individual orders.          Imaging Results    None          Medications   potassium chloride 10 mEq in 100 mL IVPB (10 mEq Intravenous Not Given 6/3/23 0900)   potassium chloride 10 mEq in 100 mL  IVPB (10 mEq Intravenous Not Given 6/3/23 0945)   0.9%  NaCl infusion (0 mLs Intravenous Stopped 6/3/23 0818)   pantoprazole injection 40 mg (40 mg Intravenous Given 6/3/23 0714)   aluminum-magnesium hydroxide-simethicone 200-200-20 mg/5 mL suspension 30 mL (30 mLs Oral Given 6/3/23 0732)     And   LIDOcaine HCl 2% oral solution 15 mL (15 mLs Oral Given 6/3/23 0732)   potassium chloride 10 mEq in 100 mL IVPB (0 mEq Intravenous Stopped 6/3/23 0842)   aluminum-magnesium hydroxide-simethicone 200-200-20 mg/5 mL suspension 30 mL (30 mLs Oral Given 6/3/23 0755)     And   LIDOcaine HCl 2% oral solution 15 mL (15 mLs Oral Given 6/3/23 0756)   hydrALAZINE injection 20 mg (20 mg Intravenous Given 6/3/23 0805)   ondansetron injection 8 mg (8 mg Intravenous Given 6/3/23 0835)   potassium chloride 10 mEq in 100 mL IVPB (0 mEq Intravenous Stopped 6/3/23 0944)   LORazepam injection 1 mg (1 mg Intravenous Given 6/3/23 0951)   prochlorperazine injection Soln 10 mg (10 mg Intravenous Given 6/3/23 0951)     Medical Decision Making:   Initial Assessment:   Gastritis  Differential Diagnosis:   GERD, pancreatitis, peptic ulcer disease  Clinical Tests:   Lab Tests: Ordered and Reviewed  The following lab test(s) were unremarkable: CBC, CMP and Lipase  ED Management:  August started on IV of normal saline and give patient Protonix 40 mg IV and administered GI cocktail.  Patient's labs will be drawn including CMP lipase CBC.  Patient's labs show a normal white count.  Chemistries reveal a potassium of 3.0 so I will go ahead and give her potassium riders x3.  Patient states she does feel somewhat better after the GI cocktail.  Patient's blood pressure has been consistently high since arriving at the emergency room so will go ahead and give a dose of IV Apresoline 20 mg.  Patient states she does feel better but is refusing her IV potassium rider.  Patient is amenable to 1 mg of Ativan IV as well as 10 mg of Compazine because he still with  some mild nausea.  I will discharge patient home on p.o. potassium.  She will also be given Zofran for nausea.  Patient will be instructed to follow up with her PCP this week.  Patient will also give a prescription for Phenergan suppositories as needed for nausea.                        Clinical Impression:   Final diagnoses:  [E87.6] Hypokalemia (Primary)  [K29.70] Gastritis without bleeding, unspecified chronicity, unspecified gastritis type        ED Disposition Condition    Discharge Stable          ED Prescriptions       Medication Sig Dispense Start Date End Date Auth. Provider    promethazine (PHENERGAN) 25 MG suppository Place 1 suppository (25 mg total) rectally every 6 (six) hours as needed for Nausea. 10 suppository 6/3/2023 -- Nando Garcia MD    promethazine (PHENERGAN) 25 MG suppository Place 1 suppository (25 mg total) rectally every 6 (six) hours as needed for Nausea. 15 suppository 6/3/2023 -- Nando Garcia MD          Follow-up Information       Follow up With Specialties Details Why Contact Info    Sebastián Munoz MD Family Medicine Schedule an appointment as soon as possible for a visit  As needed, If symptoms worsen [address] [id number]  [phone]               Nando Garcia MD  06/03/23 0991

## 2023-11-29 ENCOUNTER — HOSPITAL ENCOUNTER (OUTPATIENT)
Dept: RADIOLOGY | Facility: HOSPITAL | Age: 48
Discharge: HOME | End: 2023-11-29
Attending: FAMILY MEDICINE
Payer: COMMERCIAL

## 2023-11-29 DIAGNOSIS — M25.562 LEFT KNEE PAIN: ICD-10-CM

## 2023-11-29 PROCEDURE — 73562 X-RAY EXAM OF KNEE 3: CPT | Mod: TC,LT

## 2023-12-04 ENCOUNTER — HOSPITAL ENCOUNTER (OUTPATIENT)
Dept: RADIOLOGY | Facility: HOSPITAL | Age: 48
Discharge: HOME | End: 2023-12-04
Attending: FAMILY MEDICINE
Payer: COMMERCIAL

## 2023-12-04 DIAGNOSIS — M25.562 LEFT KNEE PAIN: ICD-10-CM

## 2023-12-04 PROCEDURE — 73721 MRI JNT OF LWR EXTRE W/O DYE: CPT | Mod: TC,LT

## 2024-07-03 ENCOUNTER — HOSPITAL ENCOUNTER (OUTPATIENT)
Dept: RADIOLOGY | Facility: HOSPITAL | Age: 49
Discharge: HOME | End: 2024-07-03
Attending: OBSTETRICS & GYNECOLOGY
Payer: COMMERCIAL

## 2024-07-03 DIAGNOSIS — Z12.31 SCREENING MAMMOGRAM, ENCOUNTER FOR: ICD-10-CM

## 2024-07-03 PROCEDURE — 77067 SCR MAMMO BI INCL CAD: CPT | Mod: TC

## 2024-12-18 ENCOUNTER — HOSPITAL ENCOUNTER (EMERGENCY)
Facility: HOSPITAL | Age: 49
Discharge: HOME | End: 2024-12-18
Attending: SURGERY
Payer: COMMERCIAL

## 2024-12-18 VITALS
WEIGHT: 158 LBS | BODY MASS INDEX: 26.33 KG/M2 | OXYGEN SATURATION: 98 % | DIASTOLIC BLOOD PRESSURE: 66 MMHG | RESPIRATION RATE: 16 BRPM | TEMPERATURE: 99 F | HEIGHT: 65 IN | HEART RATE: 75 BPM | SYSTOLIC BLOOD PRESSURE: 117 MMHG

## 2024-12-18 DIAGNOSIS — D50.9 HYPOCHROMIC MICROCYTIC ANEMIA: ICD-10-CM

## 2024-12-18 DIAGNOSIS — R10.9 FLANK PAIN: Primary | ICD-10-CM

## 2024-12-18 DIAGNOSIS — R79.89 PRERENAL AZOTEMIA: ICD-10-CM

## 2024-12-18 DIAGNOSIS — N10 SUBACUTE PYELONEPHRITIS: ICD-10-CM

## 2024-12-18 DIAGNOSIS — E87.6 HYPOKALEMIA: ICD-10-CM

## 2024-12-18 LAB
ALBUMIN SERPL-MCNC: 4.5 G/DL (ref 3.4–5)
ALBUMIN/GLOB SERPL: 1.8 RATIO
ALP SERPL-CCNC: 67 UNIT/L (ref 50–144)
ALT SERPL-CCNC: 15 UNIT/L (ref 1–45)
ANION GAP SERPL CALC-SCNC: 8 MEQ/L (ref 2–13)
AST SERPL-CCNC: 20 UNIT/L (ref 14–36)
B-HCG UR QL: NEGATIVE
BACTERIA #/AREA URNS AUTO: ABNORMAL /HPF
BASOPHILS # BLD AUTO: 0.05 X10(3)/MCL (ref 0.01–0.08)
BASOPHILS NFR BLD AUTO: 0.3 % (ref 0.1–1.2)
BILIRUB SERPL-MCNC: 0.6 MG/DL (ref 0–1)
BILIRUB UR QL STRIP.AUTO: NEGATIVE
BUN SERPL-MCNC: 34 MG/DL (ref 7–20)
CALCIUM SERPL-MCNC: 9.4 MG/DL (ref 8.4–10.2)
CHLORIDE SERPL-SCNC: 102 MMOL/L (ref 98–110)
CLARITY UR: ABNORMAL
CO2 SERPL-SCNC: 24 MMOL/L (ref 21–32)
COLOR UR AUTO: YELLOW
CREAT SERPL-MCNC: 1.04 MG/DL (ref 0.66–1.25)
CREAT/UREA NIT SERPL: 33 (ref 12–20)
EOSINOPHIL # BLD AUTO: 0.08 X10(3)/MCL (ref 0.04–0.36)
EOSINOPHIL NFR BLD AUTO: 0.4 % (ref 0.7–7)
ERYTHROCYTE [DISTWIDTH] IN BLOOD BY AUTOMATED COUNT: 13 % (ref 11–14.5)
GFR SERPLBLD CREATININE-BSD FMLA CKD-EPI: 66 ML/MIN/1.73/M2
GLOBULIN SER-MCNC: 2.5 GM/DL (ref 2–3.9)
GLUCOSE SERPL-MCNC: 106 MG/DL (ref 70–115)
GLUCOSE UR QL STRIP: NEGATIVE
HCT VFR BLD AUTO: 33.1 % (ref 36–48)
HGB BLD-MCNC: 11.6 G/DL (ref 11.8–16)
HGB UR QL STRIP: ABNORMAL
IMM GRANULOCYTES # BLD AUTO: 0.06 X10(3)/MCL (ref 0–0.03)
IMM GRANULOCYTES NFR BLD AUTO: 0.3 % (ref 0–0.5)
KETONES UR QL STRIP: NEGATIVE
LEUKOCYTE ESTERASE UR QL STRIP: ABNORMAL
LYMPHOCYTES # BLD AUTO: 1.22 X10(3)/MCL (ref 1.16–3.74)
LYMPHOCYTES NFR BLD AUTO: 6.3 % (ref 20–55)
MCH RBC QN AUTO: 31.5 PG (ref 27–34)
MCHC RBC AUTO-ENTMCNC: 35 G/DL (ref 31–37)
MCV RBC AUTO: 89.9 FL (ref 79–99)
MONOCYTES # BLD AUTO: 1.49 X10(3)/MCL (ref 0.24–0.36)
MONOCYTES NFR BLD AUTO: 7.7 % (ref 4.7–12.5)
NEUTROPHILS # BLD AUTO: 16.56 X10(3)/MCL (ref 1.56–6.13)
NEUTROPHILS NFR BLD AUTO: 85 % (ref 37–73)
NITRITE UR QL STRIP: NEGATIVE
PH UR STRIP: 6 [PH]
PLATELET # BLD AUTO: 384 X10(3)/MCL (ref 140–371)
PMV BLD AUTO: 8.1 FL (ref 9.4–12.4)
POTASSIUM SERPL-SCNC: 2.9 MMOL/L (ref 3.5–5.1)
PROT SERPL-MCNC: 7 GM/DL (ref 6.3–8.2)
PROT UR QL STRIP: 100
RBC # BLD AUTO: 3.68 X10(6)/MCL (ref 4–5.1)
RBC #/AREA URNS AUTO: ABNORMAL /HPF
SODIUM SERPL-SCNC: 134 MMOL/L (ref 136–145)
SP GR UR STRIP.AUTO: 1.01 (ref 1–1.03)
SQUAMOUS #/AREA URNS AUTO: ABNORMAL /HPF
UROBILINOGEN UR STRIP-ACNC: 0.2
WBC # BLD AUTO: 19.46 X10(3)/MCL (ref 4–11.5)
WBC #/AREA URNS AUTO: ABNORMAL /HPF

## 2024-12-18 PROCEDURE — 81015 MICROSCOPIC EXAM OF URINE: CPT | Performed by: SURGERY

## 2024-12-18 PROCEDURE — 25000003 PHARM REV CODE 250: Performed by: SURGERY

## 2024-12-18 PROCEDURE — 81025 URINE PREGNANCY TEST: CPT | Performed by: SURGERY

## 2024-12-18 PROCEDURE — 87077 CULTURE AEROBIC IDENTIFY: CPT | Performed by: SURGERY

## 2024-12-18 PROCEDURE — 96365 THER/PROPH/DIAG IV INF INIT: CPT

## 2024-12-18 PROCEDURE — 96361 HYDRATE IV INFUSION ADD-ON: CPT

## 2024-12-18 PROCEDURE — 80053 COMPREHEN METABOLIC PANEL: CPT | Performed by: SURGERY

## 2024-12-18 PROCEDURE — 81003 URINALYSIS AUTO W/O SCOPE: CPT | Performed by: SURGERY

## 2024-12-18 PROCEDURE — 96375 TX/PRO/DX INJ NEW DRUG ADDON: CPT

## 2024-12-18 PROCEDURE — 63600175 PHARM REV CODE 636 W HCPCS: Performed by: SURGERY

## 2024-12-18 PROCEDURE — 99285 EMERGENCY DEPT VISIT HI MDM: CPT | Mod: 25

## 2024-12-18 PROCEDURE — 85025 COMPLETE CBC W/AUTO DIFF WBC: CPT | Performed by: SURGERY

## 2024-12-18 RX ORDER — PROCHLORPERAZINE EDISYLATE 5 MG/ML
10 INJECTION INTRAMUSCULAR; INTRAVENOUS ONCE
Status: COMPLETED | OUTPATIENT
Start: 2024-12-18 | End: 2024-12-18

## 2024-12-18 RX ORDER — DIPHENHYDRAMINE HYDROCHLORIDE 50 MG/ML
25 INJECTION INTRAMUSCULAR; INTRAVENOUS ONCE
Status: COMPLETED | OUTPATIENT
Start: 2024-12-18 | End: 2024-12-18

## 2024-12-18 RX ORDER — SODIUM CHLORIDE 9 MG/ML
INJECTION, SOLUTION INTRAVENOUS CONTINUOUS
Status: DISCONTINUED | OUTPATIENT
Start: 2024-12-18 | End: 2024-12-18 | Stop reason: HOSPADM

## 2024-12-18 RX ORDER — KETOROLAC TROMETHAMINE 30 MG/ML
30 INJECTION, SOLUTION INTRAMUSCULAR; INTRAVENOUS
Status: COMPLETED | OUTPATIENT
Start: 2024-12-18 | End: 2024-12-18

## 2024-12-18 RX ORDER — CEFTRIAXONE 1 G/1
1 INJECTION, POWDER, FOR SOLUTION INTRAMUSCULAR; INTRAVENOUS ONCE
Status: DISCONTINUED | OUTPATIENT
Start: 2024-12-18 | End: 2024-12-18

## 2024-12-18 RX ORDER — CEFTRIAXONE 1 G/1
1 INJECTION, POWDER, FOR SOLUTION INTRAMUSCULAR; INTRAVENOUS ONCE
Status: COMPLETED | OUTPATIENT
Start: 2024-12-18 | End: 2024-12-18

## 2024-12-18 RX ORDER — TRAMADOL HYDROCHLORIDE 50 MG/1
50 TABLET ORAL ONCE
Status: DISCONTINUED | OUTPATIENT
Start: 2024-12-18 | End: 2024-12-18 | Stop reason: HOSPADM

## 2024-12-18 RX ORDER — LEVOFLOXACIN 500 MG/1
500 TABLET, FILM COATED ORAL DAILY
Qty: 10 TABLET | Refills: 0 | Status: SHIPPED | OUTPATIENT
Start: 2024-12-18 | End: 2024-12-28

## 2024-12-18 RX ORDER — POTASSIUM CHLORIDE 20 MEQ/1
40 TABLET, EXTENDED RELEASE ORAL
Status: COMPLETED | OUTPATIENT
Start: 2024-12-18 | End: 2024-12-18

## 2024-12-18 RX ORDER — FAMOTIDINE 10 MG/ML
20 INJECTION INTRAVENOUS ONCE
Status: COMPLETED | OUTPATIENT
Start: 2024-12-18 | End: 2024-12-18

## 2024-12-18 RX ORDER — TRAMADOL HYDROCHLORIDE 50 MG/1
50 TABLET ORAL EVERY 4 HOURS PRN
Qty: 10 TABLET | Refills: 0 | Status: SHIPPED | OUTPATIENT
Start: 2024-12-18

## 2024-12-18 RX ADMIN — GENTAMICIN SULFATE 399.2 MG: 40 INJECTION, SOLUTION INTRAMUSCULAR; INTRAVENOUS at 03:12

## 2024-12-18 RX ADMIN — POTASSIUM CHLORIDE 40 MEQ: 1500 TABLET, EXTENDED RELEASE ORAL at 04:12

## 2024-12-18 RX ADMIN — DIPHENHYDRAMINE HYDROCHLORIDE 25 MG: 50 INJECTION INTRAMUSCULAR; INTRAVENOUS at 02:12

## 2024-12-18 RX ADMIN — KETOROLAC TROMETHAMINE 30 MG: 30 INJECTION, SOLUTION INTRAMUSCULAR at 03:12

## 2024-12-18 RX ADMIN — FAMOTIDINE 20 MG: 10 INJECTION, SOLUTION INTRAVENOUS at 02:12

## 2024-12-18 RX ADMIN — PROCHLORPERAZINE EDISYLATE 10 MG: 5 INJECTION INTRAMUSCULAR; INTRAVENOUS at 02:12

## 2024-12-18 RX ADMIN — CEFTRIAXONE SODIUM 1 G: 1 INJECTION, POWDER, FOR SOLUTION INTRAMUSCULAR; INTRAVENOUS at 03:12

## 2024-12-18 RX ADMIN — SODIUM CHLORIDE: 9 INJECTION, SOLUTION INTRAVENOUS at 02:12

## 2024-12-18 NOTE — Clinical Note
"Christal Schneider" Destiny was seen and treated in our emergency department on 12/18/2024.  She may return to work on 12/19/2024.       If you have any questions or concerns, please don't hesitate to call.      Sarah Aldana RN    "

## 2024-12-18 NOTE — ED PROVIDER NOTES
Encounter Date: 2024       History     Chief Complaint   Patient presents with    Flank Pain     Arrives with (L) flank pain onset last night accompanied with N/V     50 YO WF  W/ ACUTE ATRAUMATIC ONSET LEFT FLANK PAIN YESTERDAY PM.  +AWAKENED FROM SLEEP W/ PROGRESSIVE PAIN.  +EMESIS X 3 W/OUT HEMATEMESIS.  NO HEMATURIA.  NO DYSURIA.  NO Hx/o URO/NEPHROLITHIASIS.  NO FC.  NO HEMATOCHEZIA.  NO         Review of patient's allergies indicates:  No Known Allergies  Past Medical History:   Diagnosis Date    Anxiety disorder, unspecified     Depression     Fibromyalgia syndrome     Gastro-esophageal reflux disease without esophagitis     Hypertension      Past Surgical History:   Procedure Laterality Date    SINUS SURGERY       No family history on file.  Social History     Tobacco Use    Smoking status: Never    Smokeless tobacco: Never   Substance Use Topics    Alcohol use: Yes    Drug use: Never     Review of Systems   All other systems reviewed and are negative.      Physical Exam     Initial Vitals [24 0147]   BP Pulse Resp Temp SpO2   133/81 85 18 97.9 °F (36.6 °C) 99 %      MAP       --         Physical Exam    Nursing note and vitals reviewed.  Constitutional: She appears well-developed and well-nourished. She is not diaphoretic. No distress.   HENT:   Head: Normocephalic.   Right Ear: External ear normal.   Left Ear: External ear normal.   Nose: Nose normal. Mouth/Throat: Oropharynx is clear and moist. No oropharyngeal exudate.   Eyes: Conjunctivae and EOM are normal. Pupils are equal, round, and reactive to light. Right eye exhibits no discharge. Left eye exhibits no discharge. No scleral icterus.   Neck: Neck supple. No thyromegaly present. No tracheal deviation present. No JVD present.   Normal range of motion.  Cardiovascular:  Normal rate, regular rhythm, normal heart sounds and intact distal pulses.     Exam reveals no gallop.       No murmur heard.  Pulmonary/Chest: Breath sounds normal.  No stridor. No respiratory distress. She has no wheezes. She has no rhonchi. She has no rales.   Abdominal: Abdomen is soft. Bowel sounds are normal. She exhibits no distension and no mass. There is no abdominal tenderness.   +LCVAT  NO RCVAT   There is no rebound and no guarding.   Musculoskeletal:         General: No tenderness or edema. Normal range of motion.      Cervical back: Normal range of motion and neck supple.     Lymphadenopathy:     She has no cervical adenopathy.   Neurological: She is alert and oriented to person, place, and time. She has normal strength. No cranial nerve deficit or sensory deficit. GCS score is 15. GCS eye subscore is 4. GCS verbal subscore is 5. GCS motor subscore is 6.   Skin: Skin is warm. Capillary refill takes less than 2 seconds. No rash noted. No erythema.   Psychiatric: She has a normal mood and affect. Her behavior is normal. Judgment and thought content normal.         ED Course   Procedures  Labs Reviewed   URINALYSIS, REFLEX TO URINE CULTURE - Abnormal       Result Value    Color, UA Yellow      Appearance, UA Cloudy (*)     Specific Gravity, UA 1.015      pH, UA 6.0      Protein,  (*)     Glucose, UA Negative      Ketones, UA Negative      Blood, UA Large (*)     Bilirubin, UA Negative      Urobilinogen, UA 0.2      Nitrites, UA Negative      Leukocyte Esterase, UA Moderate (*)     Narrative:      URINE STABILITY IS 2 HOURS AT ROOM TEMP OR    SIX HOURS REFRIGERATED. PERFORMING TESTING ON    SPECIMENS GREATER THAN THIS AGE MAY AFFECT THE    FOLLOWING TESTS:    PH          SPECIFIC GRAVITY           BLOOD    CLARITY     BILIRUBIN               UROBILINOGEN   COMPREHENSIVE METABOLIC PANEL - Abnormal    Sodium 134 (*)     Potassium 2.9 (*)     Chloride 102      CO2 24      Glucose 106      Blood Urea Nitrogen 34 (*)     Creatinine 1.04      Calcium 9.4      Protein Total 7.0      Albumin 4.5      Globulin 2.5      Albumin/Globulin Ratio 1.8      Bilirubin Total 0.6       ALP 67      ALT 15      AST 20      eGFR 66      Anion Gap 8.0      BUN/Creatinine Ratio 33 (*)    CBC WITH DIFFERENTIAL - Abnormal    WBC 19.46 (*)     RBC 3.68 (*)     Hgb 11.6 (*)     Hct 33.1 (*)     MCV 89.9      MCH 31.5      MCHC 35.0      RDW 13.0      Platelet 384 (*)     MPV 8.1 (*)     Neut % 85.0 (*)     Lymph % 6.3 (*)     Mono % 7.7      Eos % 0.4 (*)     Basophil % 0.3      Lymph # 1.22      Neut # 16.56 (*)     Mono # 1.49 (*)     Eos # 0.08      Baso # 0.05      IG# 0.06 (*)     IG% 0.3     URINALYSIS, MICROSCOPIC - Abnormal    Bacteria, UA Moderate (*)     RBC, UA 11-20 (*)     WBC, UA 50-99 (*)     Squamous Epithelial Cells, UA Few (*)    HCG QUALITATIVE URINE - Normal    hCG Qualitative, Urine Negative     CULTURE, URINE   CBC W/ AUTO DIFFERENTIAL    Narrative:     The following orders were created for panel order CBC Auto Differential.  Procedure                               Abnormality         Status                     ---------                               -----------         ------                     CBC with Differential[[id number]]       Abnormal            Final result                 Please view results for these tests on the individual orders.          Imaging Results              CT Abdomen Pelvis  Without Contrast (Final result)  Result time 12/18/24 04:54:34      Final result by Darell Perez III, MD (12/18/24 04:54:34)                   Impression:      1. A 4 cm, round radiopaque foreign body is noted near the junction of the cervix and vaginal fornices and I suspect represents a pessary as described in the initial wet read.  2. There is mild dilatation of the proximal left ureter and pelvicaliceal system with no obvious obstructing radiopaque stones.  These findings are nonspecific but can be seen with a recently passed ureteral stone and clinical correlation is recommended.  3. No significant wet read discrepancy.      Electronically signed by: Darell  Ana  Date:    12/18/2024  Time:    04:54               Narrative:    EXAMINATION:  CT ABDOMEN PELVIS WITHOUT CONTRAST    CLINICAL HISTORY AND TECHNIQUE:    * Prieto Willis, RT on 12/18/2024  2:37 AM CST: LT FLANK PN  N\V      This patient has had 0 CT and nuclear medicine scans performed within the last 12 months.    The following DOSE REDUCTION TECHNIQUES are used for all CT scans at [facility]:    1. Automated exposure control.  2. Adjustment of the mA and/or kv according to patient size.  3. Use of iterative reconstruction technique.    COMPARISON:  None    FINDINGS:  Liver: No clinically significant abnormalities are noted.    Gallbladder/biliary system: No clinically significant abnormalities are noted.    Spleen: No clinically significant abnormalities are noted.    Adrenal glands: No clinically significant abnormalities are noted.    Pancreas: No clinically significant abnormalities are noted.    Kidneys/ureters: There is minimal dilatation of the proximal left ureter and pelvicaliceal system with no obvious radiopaque stones noted.  No worrisome renal parenchymal abnormalities are appreciated.    Urinary bladder: No clinically significant abnormalities are noted.    Uterus and ovaries: A 4 cm, round radiopaque foreign body is noted near the junction of the cervix and vaginal fornices and I suspect represents a pessary as described in the initial wet read.    GI tract: Unopacified loops of large and small bowel as well as the gastric lumen and appendix are difficult to evaluate with no definite abnormalities appreciated.    Vascular structures: No clinically significant abnormalities are noted.    Musculoskeletal structures: Mild degenerative changes are noted involving the lumbar spine.    Miscellaneous: N/A                        Preliminary result by Ta Cazares Jr., MD (12/18/24 03:01:33)                   Impression:    1. There is mild left hydronephrosis and prominent  ureter with perinephric fat stranding. The findings are non-specific- may reflect pyelonephritis versus recently passed stone. Correlate clinically as regards to additional evaluation and follow up.  2. Details and other findings as discussed above.               Narrative:    START OF REPORT:  Technique: CT of the abdomen and pelvis was performed with axial images as well as sagittal and coronal reconstruction images without intravenous contrast.    Comparison: Comparison is with study dated 2022-05-11 15:42:03.    Clinical History: LT FLANK PN NV.    Dosage Information: Automated Exposure Control was utilized.    Findings:  Lines and Tubes: None.  Thorax:  Lungs: The visualized lung bases appear unremarkable.  Pleura: No effusions or thickening.  Heart: The heart size is within normal limits.  Abdomen:  Abdominal Wall: No abdominal wall pathology is seen.  Liver: The liver appears unremarkable.  Biliary System: No intrahepatic or extrahepatic biliary duct dilatation is seen.  Gallbladder: The gallbladder appears unremarkable.  Pancreas: The pancreas appears unremarkable.  Spleen: The spleen appears unremarkable.  Adrenals: The adrenal glands appear unremarkable.  Kidneys: The right kidney appears unremarkable with no stones cysts masses or hydronephrosis. Mild renal size discrepancy is again noted, left kidney is mildly larger than right. There is mild left hydronephrosis and prominent ureter with perinephric fat stranding. The findings are non-specific- may reflect pyelonephritis versus recently passed stone.  Aorta: The abdominal aorta appears unremarkable.  Bowel:  Esophagus: The visualized esophagus appears unremarkable.  Stomach: The stomach appears unremarkable.  Duodenum: Unremarkable appearing duodenum.  Small Bowel: The small bowel appears unremarkable.  Colon: Nondistended.  Appendix: The appendix appears unremarkable seen on Image 41, Series 3 through Image 47, Series 3.  Peritoneum: No intraperitoneal  free air or ascites is seen.    Pelvis:  Bladder: The bladder appears unremarkable.  Female:  Uterus: The uterus appears unremarkable. A foreign body is again seen in the vagina that may reflect pessary.  Ovaries: The ovaries appear unremarkable.    Bony structures:  Dorsal Spine: The visualized dorsal spine appears unremarkable.  Bony Pelvis: The visualized bony structures of the pelvis appear unremarkable.                                         Medications   prochlorperazine injection Soln 10 mg (10 mg Intravenous Given 12/18/24 0212)   diphenhydrAMINE injection 25 mg (25 mg Intravenous Given 12/18/24 0212)   famotidine (PF) injection 20 mg (20 mg Intravenous Given 12/18/24 0212)   ketorolac injection 30 mg (30 mg Intravenous Given 12/18/24 0305)   gentamicin (GARAMYCIN) 399.2 mg in 0.9% NaCl 100 mL IVPB (0 mg Intravenous Stopped 12/18/24 0445)   cefTRIAXone injection 1 g (1 g Intravenous Given 12/18/24 0305)   potassium chloride SA CR tablet 40 mEq (40 mEq Oral Given 12/18/24 0408)     Medical Decision Making             ED Course as of 12/18/24 1831   Wed Dec 18, 2024   0243 Potassium(!): 2.9 [WV]   0244 BUN(!): 34 [WV]   0245 hCG Qualitative, Urine: Negative [WV]   0245 RBC, UA(!): 11-20 [WV]   0245 WBC, UA(!): 50-99 [WV]   0245 Bacteria, UA(!): Moderate [WV]   0245 WBC(!): 19.46 [WV]   0245 RBC(!): 3.68 [WV]   0245 Hemoglobin(!): 11.6 [WV]   0245 Hematocrit(!): 33.1 [WV]   0245 Platelet Count(!): 384 [WV]   0320    Impression:  1. There is mild left hydronephrosis and prominent ureter with perinephric fat stranding. The findings are non-specific- may reflect pyelonephritis versus recently passed stone. Correlate clinically as regards to additional evaluation and follow up.  2. Details and other findings as discussed above.   [WV]      ED Course User Index  [WV] Ki Stone MD                           Clinical Impression:  Final diagnoses:  [R10.9] Flank pain (Primary)  [N10] Subacute  pyelonephritis  [E87.6] Hypokalemia  [R79.89] Prerenal azotemia  [D50.9] Hypochromic microcytic anemia          ED Disposition Condition    Discharge Stable          ED Prescriptions       Medication Sig Dispense Start Date End Date Auth. Provider    levoFLOXacin (LEVAQUIN) 500 MG tablet Take 1 tablet (500 mg total) by mouth once daily. for 10 days 10 tablet 12/18/2024 12/28/2024 Ki Stone MD    traMADoL (ULTRAM) 50 mg tablet Take 1 tablet (50 mg total) by mouth every 4 (four) hours as needed for Pain. 10 tablet 12/18/2024 -- Ki Stone MD          Follow-up Information       Follow up With Specialties Details Why Contact Info    Sebastián Munoz MD Family Medicine On 12/18/2024  [address] [id number]  [phone]               Ki Stone MD  12/18/24 2632

## 2024-12-18 NOTE — DISCHARGE INSTRUCTIONS
FOLLOW UP WITH PERSONAL PHYSICIAN LATER TODAY.  RETURN TO ER IF SYMPTOMS INCREASE OR IF NEW SYMPTOMS DEVELOP.

## 2024-12-18 NOTE — Clinical Note
"Christal "Christal" Destiny was seen and treated in our emergency department on 12/18/2024.  She may return to work on 12/20/2024.       If you have any questions or concerns, please don't hesitate to call.      Ki Stone MD"

## 2024-12-20 LAB — BACTERIA UR CULT: ABNORMAL

## 2025-02-01 ENCOUNTER — HOSPITAL ENCOUNTER (OUTPATIENT)
Facility: HOSPITAL | Age: 50
Discharge: HOME | End: 2025-02-02
Attending: EMERGENCY MEDICINE | Admitting: INTERNAL MEDICINE
Payer: COMMERCIAL

## 2025-02-01 DIAGNOSIS — R11.2 CANNABINOID HYPEREMESIS SYNDROME: ICD-10-CM

## 2025-02-01 DIAGNOSIS — R07.89 CHEST TIGHTNESS: ICD-10-CM

## 2025-02-01 DIAGNOSIS — F41.9 ANXIETY: ICD-10-CM

## 2025-02-01 DIAGNOSIS — R11.2 NAUSEA AND VOMITING, UNSPECIFIED VOMITING TYPE: Primary | ICD-10-CM

## 2025-02-01 DIAGNOSIS — R94.31 PROLONGED Q-T INTERVAL ON ECG: ICD-10-CM

## 2025-02-01 DIAGNOSIS — F12.90 CANNABINOID HYPEREMESIS SYNDROME: ICD-10-CM

## 2025-02-01 DIAGNOSIS — E87.1 HYPONATREMIA: ICD-10-CM

## 2025-02-01 DIAGNOSIS — E87.6 HYPOKALEMIA: ICD-10-CM

## 2025-02-01 LAB
ALBUMIN SERPL-MCNC: 5.2 G/DL (ref 3.4–5)
ALBUMIN/GLOB SERPL: 2 RATIO
ALP SERPL-CCNC: 61 UNIT/L (ref 50–144)
ALT SERPL-CCNC: 28 UNIT/L (ref 1–45)
AMPHET UR QL SCN: NEGATIVE
ANION GAP SERPL CALC-SCNC: 18 MEQ/L (ref 2–13)
ANION GAP SERPL CALC-SCNC: 9 MEQ/L (ref 2–13)
AST SERPL-CCNC: 32 UNIT/L (ref 14–36)
BARBITURATE SCN PRESENT UR: NEGATIVE
BASOPHILS # BLD AUTO: 0.02 X10(3)/MCL (ref 0.01–0.08)
BASOPHILS NFR BLD AUTO: 0.2 % (ref 0.1–1.2)
BENZODIAZ UR QL SCN: NEGATIVE
BILIRUB SERPL-MCNC: 1.4 MG/DL (ref 0–1)
BILIRUB UR QL STRIP.AUTO: NEGATIVE
BUN SERPL-MCNC: 26 MG/DL (ref 7–20)
BUN SERPL-MCNC: 33 MG/DL (ref 7–20)
CALCIUM SERPL-MCNC: 8.6 MG/DL (ref 8.4–10.2)
CALCIUM SERPL-MCNC: 9.2 MG/DL (ref 8.4–10.2)
CANNABINOIDS UR QL SCN: POSITIVE
CHLORIDE SERPL-SCNC: 83 MMOL/L (ref 98–110)
CHLORIDE SERPL-SCNC: 89 MMOL/L (ref 98–110)
CLARITY UR: CLEAR
CO2 SERPL-SCNC: 25 MMOL/L (ref 21–32)
CO2 SERPL-SCNC: 30 MMOL/L (ref 21–32)
COCAINE UR QL SCN: NEGATIVE
COLOR UR AUTO: YELLOW
CREAT SERPL-MCNC: 1.22 MG/DL (ref 0.66–1.25)
CREAT SERPL-MCNC: 1.37 MG/DL (ref 0.66–1.25)
CREAT/UREA NIT SERPL: 21 (ref 12–20)
CREAT/UREA NIT SERPL: 24 (ref 12–20)
EOSINOPHIL # BLD AUTO: 0.01 X10(3)/MCL (ref 0.04–0.36)
EOSINOPHIL NFR BLD AUTO: 0.1 % (ref 0.7–7)
ERYTHROCYTE [DISTWIDTH] IN BLOOD BY AUTOMATED COUNT: 11.1 % (ref 11–14.5)
GFR SERPLBLD CREATININE-BSD FMLA CKD-EPI: 48 ML/MIN/1.73/M2
GFR SERPLBLD CREATININE-BSD FMLA CKD-EPI: 55 ML/MIN/1.73/M2
GLOBULIN SER-MCNC: 2.6 GM/DL (ref 2–3.9)
GLUCOSE SERPL-MCNC: 117 MG/DL (ref 70–115)
GLUCOSE SERPL-MCNC: 96 MG/DL (ref 70–115)
GLUCOSE UR QL STRIP: NEGATIVE
HCT VFR BLD AUTO: 39.6 % (ref 36–48)
HGB BLD-MCNC: 14.8 G/DL (ref 11.8–16)
HGB UR QL STRIP: NEGATIVE
IMM GRANULOCYTES # BLD AUTO: 0.05 X10(3)/MCL (ref 0–0.03)
IMM GRANULOCYTES NFR BLD AUTO: 0.4 % (ref 0–0.5)
KETONES UR QL STRIP: 15
LEUKOCYTE ESTERASE UR QL STRIP: NEGATIVE
LIPASE SERPL-CCNC: 200 U/L (ref 23–300)
LYMPHOCYTES # BLD AUTO: 2.03 X10(3)/MCL (ref 1.16–3.74)
LYMPHOCYTES NFR BLD AUTO: 16.7 % (ref 20–55)
MAGNESIUM SERPL-MCNC: 2.5 MG/DL (ref 1.8–2.4)
MCH RBC QN AUTO: 31.4 PG (ref 27–34)
MCHC RBC AUTO-ENTMCNC: 37.4 G/DL (ref 31–37)
MCV RBC AUTO: 84.1 FL (ref 79–99)
METHADONE UR QL SCN: NEGATIVE
MONOCYTES # BLD AUTO: 1.5 X10(3)/MCL (ref 0.24–0.36)
MONOCYTES NFR BLD AUTO: 12.3 % (ref 4.7–12.5)
NEUTROPHILS # BLD AUTO: 8.58 X10(3)/MCL (ref 1.56–6.13)
NEUTROPHILS NFR BLD AUTO: 70.3 % (ref 37–73)
NITRITE UR QL STRIP: NEGATIVE
NRBC BLD AUTO-RTO: 0 %
OPIATES UR QL SCN: NEGATIVE
PCP UR QL: NEGATIVE
PH UR STRIP: 6 [PH]
PH UR: 6 [PH] (ref 5–8)
PLATELET # BLD AUTO: 561 X10(3)/MCL (ref 140–371)
PMV BLD AUTO: 8 FL (ref 9.4–12.4)
POTASSIUM SERPL-SCNC: 2.4 MMOL/L (ref 3.5–5.1)
POTASSIUM SERPL-SCNC: 2.5 MMOL/L (ref 3.5–5.1)
PROT SERPL-MCNC: 7.8 GM/DL (ref 6.3–8.2)
PROT UR QL STRIP: NEGATIVE
RBC # BLD AUTO: 4.71 X10(6)/MCL (ref 4–5.1)
SODIUM SERPL-SCNC: 126 MMOL/L (ref 136–145)
SODIUM SERPL-SCNC: 128 MMOL/L (ref 136–145)
SP GR UR STRIP.AUTO: 1.01 (ref 1–1.03)
TROPONIN I SERPL-MCNC: 0.02 NG/ML (ref 0–0.03)
UROBILINOGEN UR STRIP-ACNC: 0.2
WBC # BLD AUTO: 12.19 X10(3)/MCL (ref 4–11.5)

## 2025-02-01 PROCEDURE — 36415 COLL VENOUS BLD VENIPUNCTURE: CPT | Performed by: INTERNAL MEDICINE

## 2025-02-01 PROCEDURE — 83735 ASSAY OF MAGNESIUM: CPT

## 2025-02-01 PROCEDURE — 93010 ELECTROCARDIOGRAM REPORT: CPT | Mod: ,,, | Performed by: INTERNAL MEDICINE

## 2025-02-01 PROCEDURE — 63600175 PHARM REV CODE 636 W HCPCS

## 2025-02-01 PROCEDURE — 96375 TX/PRO/DX INJ NEW DRUG ADDON: CPT

## 2025-02-01 PROCEDURE — 96372 THER/PROPH/DIAG INJ SC/IM: CPT | Performed by: INTERNAL MEDICINE

## 2025-02-01 PROCEDURE — 93005 ELECTROCARDIOGRAM TRACING: CPT

## 2025-02-01 PROCEDURE — 96365 THER/PROPH/DIAG IV INF INIT: CPT

## 2025-02-01 PROCEDURE — G0378 HOSPITAL OBSERVATION PER HR: HCPCS

## 2025-02-01 PROCEDURE — 80053 COMPREHEN METABOLIC PANEL: CPT

## 2025-02-01 PROCEDURE — 85025 COMPLETE CBC W/AUTO DIFF WBC: CPT

## 2025-02-01 PROCEDURE — 25000003 PHARM REV CODE 250: Performed by: INTERNAL MEDICINE

## 2025-02-01 PROCEDURE — 25000003 PHARM REV CODE 250

## 2025-02-01 PROCEDURE — 83690 ASSAY OF LIPASE: CPT

## 2025-02-01 PROCEDURE — 96361 HYDRATE IV INFUSION ADD-ON: CPT

## 2025-02-01 PROCEDURE — 63600175 PHARM REV CODE 636 W HCPCS: Performed by: EMERGENCY MEDICINE

## 2025-02-01 PROCEDURE — 94761 N-INVAS EAR/PLS OXIMETRY MLT: CPT

## 2025-02-01 PROCEDURE — 80307 DRUG TEST PRSMV CHEM ANLYZR: CPT

## 2025-02-01 PROCEDURE — 99285 EMERGENCY DEPT VISIT HI MDM: CPT | Mod: 25

## 2025-02-01 PROCEDURE — 84484 ASSAY OF TROPONIN QUANT: CPT

## 2025-02-01 PROCEDURE — 63600175 PHARM REV CODE 636 W HCPCS: Performed by: INTERNAL MEDICINE

## 2025-02-01 PROCEDURE — 81003 URINALYSIS AUTO W/O SCOPE: CPT

## 2025-02-01 RX ORDER — ALUMINUM HYDROXIDE, MAGNESIUM HYDROXIDE, AND SIMETHICONE 1200; 120; 1200 MG/30ML; MG/30ML; MG/30ML
30 SUSPENSION ORAL ONCE
Status: COMPLETED | OUTPATIENT
Start: 2025-02-01 | End: 2025-02-01

## 2025-02-01 RX ORDER — DULOXETIN HYDROCHLORIDE 30 MG/1
60 CAPSULE, DELAYED RELEASE ORAL DAILY
Status: DISCONTINUED | OUTPATIENT
Start: 2025-02-02 | End: 2025-02-02 | Stop reason: HOSPADM

## 2025-02-01 RX ORDER — POTASSIUM CHLORIDE 20 MEQ/1
40 TABLET, EXTENDED RELEASE ORAL ONCE
Status: COMPLETED | OUTPATIENT
Start: 2025-02-01 | End: 2025-02-01

## 2025-02-01 RX ORDER — POTASSIUM CHLORIDE 7.45 MG/ML
10 INJECTION INTRAVENOUS
Status: COMPLETED | OUTPATIENT
Start: 2025-02-01 | End: 2025-02-01

## 2025-02-01 RX ORDER — TALC
6 POWDER (GRAM) TOPICAL NIGHTLY PRN
Status: DISCONTINUED | OUTPATIENT
Start: 2025-02-01 | End: 2025-02-01

## 2025-02-01 RX ORDER — LORAZEPAM 2 MG/ML
0.5 INJECTION INTRAMUSCULAR
Status: DISCONTINUED | OUTPATIENT
Start: 2025-02-01 | End: 2025-02-02 | Stop reason: HOSPADM

## 2025-02-01 RX ORDER — HEPARIN SODIUM 5000 [USP'U]/ML
5000 INJECTION, SOLUTION INTRAVENOUS; SUBCUTANEOUS EVERY 8 HOURS
Status: DISCONTINUED | OUTPATIENT
Start: 2025-02-01 | End: 2025-02-02 | Stop reason: HOSPADM

## 2025-02-01 RX ORDER — TALC
6 POWDER (GRAM) TOPICAL NIGHTLY PRN
Status: DISCONTINUED | OUTPATIENT
Start: 2025-02-01 | End: 2025-02-02 | Stop reason: HOSPADM

## 2025-02-01 RX ORDER — ACETAMINOPHEN 325 MG/1
650 TABLET ORAL EVERY 8 HOURS PRN
Status: DISCONTINUED | OUTPATIENT
Start: 2025-02-01 | End: 2025-02-02 | Stop reason: HOSPADM

## 2025-02-01 RX ORDER — SODIUM CHLORIDE 0.9 % (FLUSH) 0.9 %
10 SYRINGE (ML) INJECTION
Status: DISCONTINUED | OUTPATIENT
Start: 2025-02-01 | End: 2025-02-02 | Stop reason: HOSPADM

## 2025-02-01 RX ORDER — CEFTRIAXONE 1 G/1
1 INJECTION, POWDER, FOR SOLUTION INTRAMUSCULAR; INTRAVENOUS
Status: DISCONTINUED | OUTPATIENT
Start: 2025-02-01 | End: 2025-02-01

## 2025-02-01 RX ORDER — PROCHLORPERAZINE EDISYLATE 5 MG/ML
5 INJECTION INTRAMUSCULAR; INTRAVENOUS
Status: COMPLETED | OUTPATIENT
Start: 2025-02-01 | End: 2025-02-01

## 2025-02-01 RX ORDER — FAMOTIDINE 10 MG/ML
20 INJECTION INTRAVENOUS
Status: COMPLETED | OUTPATIENT
Start: 2025-02-01 | End: 2025-02-01

## 2025-02-01 RX ORDER — LIDOCAINE HYDROCHLORIDE 20 MG/ML
15 SOLUTION OROPHARYNGEAL ONCE
Status: COMPLETED | OUTPATIENT
Start: 2025-02-01 | End: 2025-02-01

## 2025-02-01 RX ORDER — MORPHINE SULFATE 2 MG/ML
2 INJECTION, SOLUTION INTRAMUSCULAR; INTRAVENOUS EVERY 4 HOURS PRN
Status: DISCONTINUED | OUTPATIENT
Start: 2025-02-01 | End: 2025-02-02 | Stop reason: HOSPADM

## 2025-02-01 RX ORDER — CLONAZEPAM 0.5 MG/1
1 TABLET ORAL
Status: COMPLETED | OUTPATIENT
Start: 2025-02-01 | End: 2025-02-01

## 2025-02-01 RX ORDER — SODIUM CHLORIDE 0.9 % (FLUSH) 0.9 %
10 SYRINGE (ML) INJECTION
Status: DISCONTINUED | OUTPATIENT
Start: 2025-02-01 | End: 2025-02-01

## 2025-02-01 RX ADMIN — POTASSIUM CHLORIDE 40 MEQ: 1500 TABLET, EXTENDED RELEASE ORAL at 10:02

## 2025-02-01 RX ADMIN — POTASSIUM CHLORIDE 10 MEQ: 7.46 INJECTION, SOLUTION INTRAVENOUS at 03:02

## 2025-02-01 RX ADMIN — FAMOTIDINE 20 MG: 10 INJECTION, SOLUTION INTRAVENOUS at 02:02

## 2025-02-01 RX ADMIN — HEPARIN SODIUM 5000 UNITS: 5000 INJECTION, SOLUTION INTRAVENOUS; SUBCUTANEOUS at 09:02

## 2025-02-01 RX ADMIN — SODIUM CHLORIDE 1000 ML: 9 INJECTION, SOLUTION INTRAVENOUS at 02:02

## 2025-02-01 RX ADMIN — PROCHLORPERAZINE EDISYLATE 5 MG: 5 INJECTION INTRAMUSCULAR; INTRAVENOUS at 02:02

## 2025-02-01 RX ADMIN — LIDOCAINE HYDROCHLORIDE 15 ML: 20 SOLUTION ORAL at 02:02

## 2025-02-01 RX ADMIN — POTASSIUM BICARBONATE 40 MEQ: 391 TABLET, EFFERVESCENT ORAL at 02:02

## 2025-02-01 RX ADMIN — CLONAZEPAM 1 MG: 0.5 TABLET ORAL at 02:02

## 2025-02-01 RX ADMIN — ALUMINUM HYDROXIDE, MAGNESIUM HYDROXIDE, AND DIMETHICONE 30 ML: 200; 20; 200 SUSPENSION ORAL at 02:02

## 2025-02-01 NOTE — ED PROVIDER NOTES
Encounter Date: 2/1/2025       History     Chief Complaint   Patient presents with    Nausea     N/V x 5 days - tx as GERD by PCP since that time - retching without emesis today  - Protonix , phenergan Rx     See MDM.     The history is provided by the patient. No  was used.     Review of patient's allergies indicates:  No Known Allergies  Past Medical History:   Diagnosis Date    Anxiety disorder, unspecified     Depression     Fibromyalgia syndrome     Gastro-esophageal reflux disease without esophagitis     Hypertension      Past Surgical History:   Procedure Laterality Date    knee sx      SINUS SURGERY       No family history on file.  Social History     Tobacco Use    Smoking status: Never    Smokeless tobacco: Never   Substance Use Topics    Alcohol use: Not Currently    Drug use: Never     Review of Systems   Constitutional:  Positive for chills and fever.   Respiratory:  Positive for chest tightness. Negative for wheezing.    Cardiovascular:  Negative for palpitations.   Gastrointestinal:  Positive for abdominal pain, nausea and vomiting. Negative for diarrhea.   Genitourinary:  Negative for dysuria and hematuria.   Psychiatric/Behavioral:  The patient is nervous/anxious.    All other systems reviewed and are negative.      Physical Exam     Initial Vitals [02/01/25 1321]   BP Pulse Resp Temp SpO2   129/86 (!) 113 20 98.4 °F (36.9 °C) 98 %      MAP       --         Physical Exam    Nursing note and vitals reviewed.  Constitutional: She appears well-developed and well-nourished. She is not diaphoretic.   Anxious and tearful in ED   HENT:   Head: Normocephalic and atraumatic.   Cardiovascular:  Normal rate, regular rhythm and normal heart sounds.     Exam reveals no gallop and no friction rub.       No murmur heard.  Pulmonary/Chest: Breath sounds normal. No respiratory distress. She has no wheezes. She has no rhonchi. She has no rales. She exhibits no tenderness.   Abdominal: Abdomen is  soft. Bowel sounds are normal. She exhibits no distension and no mass. There is no abdominal tenderness (no reproducible abdominal tenderness). There is no rebound and no guarding.     Neurological: She is alert and oriented to person, place, and time.   Skin: Skin is warm and dry.   Psychiatric: She has a normal mood and affect. Her behavior is normal.         ED Course   Procedures  Labs Reviewed   COMPREHENSIVE METABOLIC PANEL - Abnormal       Result Value    Sodium 126 (*)     Potassium 2.4 (*)     Chloride 83 (*)     CO2 25      Glucose 96      Blood Urea Nitrogen 33 (*)     Creatinine 1.37 (*)     Calcium 9.2      Protein Total 7.8      Albumin 5.2 (*)     Globulin 2.6      Albumin/Globulin Ratio 2.0      Bilirubin Total 1.4 (*)     ALP 61      ALT 28      AST 32      eGFR 48      Anion Gap 18.0 (*)     BUN/Creatinine Ratio 24 (*)    MAGNESIUM - Abnormal    Magnesium Level 2.50 (*)    CBC WITH DIFFERENTIAL - Abnormal    WBC 12.19 (*)     RBC 4.71      Hgb 14.8      Hct 39.6      MCV 84.1      MCH 31.4      MCHC 37.4 (*)     RDW 11.1      Platelet 561 (*)     MPV 8.0 (*)     Neut % 70.3      Lymph % 16.7 (*)     Mono % 12.3      Eos % 0.1 (*)     Basophil % 0.2      Lymph # 2.03      Neut # 8.58 (*)     Mono # 1.50 (*)     Eos # 0.01 (*)     Baso # 0.02      Imm Gran # 0.05 (*)     Imm Grans % 0.4      NRBC% 0.0     URINALYSIS, REFLEX TO URINE CULTURE - Abnormal    Color, UA Yellow      Appearance, UA Clear      Specific Gravity, UA 1.015      pH, UA 6.0      Protein, UA Negative      Glucose, UA Negative      Ketones, UA 15 (*)     Blood, UA Negative      Bilirubin, UA Negative      Urobilinogen, UA 0.2      Nitrites, UA Negative      Leukocyte Esterase, UA Negative      Narrative:      URINE STABILITY IS 2 HOURS AT ROOM TEMP OR    SIX HOURS REFRIGERATED. PERFORMING TESTING ON    SPECIMENS GREATER THAN THIS AGE MAY AFFECT THE    FOLLOWING TESTS:    PH          SPECIFIC GRAVITY           BLOOD    CLARITY      BILIRUBIN               UROBILINOGEN   DRUG SCREEN, URINE (BEAKER) - Abnormal    Amphetamines, Urine Negative      Barbiturates, Urine Negative      Benzodiazepine, Urine Negative      Cannabinoids, Urine Positive (*)     Cocaine, Urine Negative      Opiates, Urine Negative      Phencyclidine, Urine Negative      Methadone, Urine Negative      pH, Urine 6.0      Narrative:     Cut off concentrations:    Amphetamines - 1000 ng/ml  Barbiturates - 200 ng/ml  Benzodiazepine - 200 ng/ml  Cannabinoids (THC) - 50 ng/ml  Cocaine - 300 ng/ml  Fentanyl - 1.0 ng/ml  MDMA - 500 ng/ml  Opiates - 300 ng/ml   Phencyclidine (PCP) - 25 ng/ml  Methadone - 300 ng/ml      False negatives may result form substances such as bleach added to urine.  False positives may result for the presence of a substance with similar chemical structure to the drug or its metabolite.    This test provides only a PRELIMINARY analytical test result. A more specific alternate chemical method must be used in order to obtain a confirmed analytical result. Gas chromatography/mass spectrometry (GC/MS) is the preferred confirmatory method. Other chemical confirmation methods are available. Clinical consideration and professional judgement should be applied to any drug of abuse test result, particularly when preliminary positive results are used.    Positive results will be confirmed only at the physicians request. Unconfirmed screening results are to be used only for medical purposes (treatment).          LIPASE - Normal    Lipase Level 200     TROPONIN I - Normal    Troponin-I 0.020     CBC W/ AUTO DIFFERENTIAL    Narrative:     The following orders were created for panel order CBC auto differential.  Procedure                               Abnormality         Status                     ---------                               -----------         ------                     CBC with Differential[[id number]]       Abnormal            Final result                  Please view results for these tests on the individual orders.   BASIC METABOLIC PANEL     EKG Readings: (Independently Interpreted)   Initial Reading: No STEMI. Previous EKG Date: 5-11-22. Rhythm: Sinus Tachycardia. Heart Rate: 102. Ectopy: No Ectopy. ST Segments: Normal ST Segments. Axis: Normal. Other Findings: Prolonged QT Interval.   When compared to previous, also had a prolonged QT interval       Imaging Results              X-Ray Chest AP Portable (Final result)  Result time 02/01/25 14:29:56      Final result by Dion Chao MD (02/01/25 14:29:56)                   Impression:      No acute abnormality.      Electronically signed by: Dion Chao  Date:    02/01/2025  Time:    14:29               Narrative:    EXAMINATION:  XR CHEST AP PORTABLE    CLINICAL HISTORY:  Other chest pain    TECHNIQUE:  Single frontal view of the chest was performed.    COMPARISON:  None    FINDINGS:  The lungs show a gentle diffuse chronic appearing accentuation to the interstitial markings, with normal appearance of pulmonary vasculature and no pleural effusion or pneumothorax.    The cardiac silhouette is normal in size. The hilar and mediastinal contours are unremarkable.    Bones are intact.                                       Medications   LORazepam injection 0.5 mg (has no administration in time range)   DULoxetine DR capsule 60 mg (has no administration in time range)   prochlorperazine injection Soln 5 mg (5 mg Intravenous Given 2/1/25 1403)   famotidine (PF) injection 20 mg (20 mg Intravenous Given 2/1/25 1403)   aluminum-magnesium hydroxide-simethicone 200-200-20 mg/5 mL suspension 30 mL (30 mLs Oral Given 2/1/25 1408)     And   LIDOcaine viscous HCl 2% oral solution 15 mL (15 mLs Oral Given 2/1/25 1408)   clonazePAM tablet 1 mg (1 mg Oral Given 2/1/25 1422)   potassium bicarbonate disintegrating tablet 40 mEq (40 mEq Oral Given 2/1/25 1442)   sodium chloride 0.9% bolus 1,000 mL 1,000 mL (0 mLs Intravenous  Stopped 2/1/25 1640)   potassium chloride 10 mEq in 100 mL IVPB (0 mEq Intravenous Stopped 2/1/25 1640)     Medical Decision Making  Pt is a 48 y/o female with hx of HTN, anxiety, fibromyalgia, GERD who presents with nausea and vomiting since Monday. States that she has seen her PCP twice for these symptoms, was given phenergan, mylanta, and viscous lidocaine for the symptoms which she has been using without significant relief. Last dose of mylanta and phenergant this morning at 2 am. Was able to eat some crackers and broth this morning and keep down. States that she is also having chest tightness and burning which she typically has with these episodes. Denies radiation. Unable to rate the pain. Does have some anxiety and shortness of breath, triggered by these symptoms. Notes that she last used marijuana over the weekend, just before her symptoms started. Notes that hot baths make her nausea go away.    Slight leukocytosis of 12 with shift. No anemia. K 2.4, Oral replacement given in ED, pt has hx of low potassium, baseline of about 3. Na 126. Creatinine 1.37, BUN 33, GFR 48. Gap 18. Mag 2.5. Troponin negative. EKG shows sinus tachycardia rate 102 without acute ST or T wave abnormalities, QT interval prolonged at 513. UA shows 15 ketones, no evidence of infection. CXR without acute cardiopulmonary abnormalities. Positive cannabis on UDS. Pt is feeling significantly improved since being in ED, pain, anxiety, and nausea resolved.     Amount and/or Complexity of Data Reviewed  External Data Reviewed: ECG and notes.     Details: Reviewed previous notes, labs, and ECG from previous workup for similar symptoms. Hx of prolonged QT on previous ECG's, potassium runs low at about 3.0 as baseline  Labs: ordered. Decision-making details documented in ED Course.  Radiology: ordered. Decision-making details documented in ED Course.  ECG/medicine tests:  Decision-making details documented in ED Course.  Discussion of management  or test interpretation with external provider(s): Discussed pt with Dr. Amezquita who had face-to-face with patient and is agreeable to admission.  Dr. Brown discussed pt with hospitalist who is agreeable to admission.    Risk  OTC drugs.  Prescription drug management.      Additional MDM:   Differential Diagnosis:   Other: The following diagnoses were also considered and will be evaluated: viral gastroenteritisi, electrolyte abnormality and anxiety.            ED Course as of 02/01/25 1728   Sat Feb 01, 2025   1405 WBC(!): 12.19 [ME]   1405 Hemoglobin: 14.8 [ME]   1405 Hematocrit: 39.6 [ME]   1405 Immature Grans (Abs)(!): 0.05 [ME]   1405 Immature Granulocytes: 0.4 [ME]   1436 Lipase: 200 [ME]   1442 Magnesium (!): 2.50 [ME]   1442 Sodium(!): 126 [ME]   1442 Potassium(!!): 2.4 [ME]   1442 Chloride(!): 83 [ME]   1442 BUN(!): 33 [ME]   1442 Creatinine(!): 1.37 [ME]   1442 Anion Gap(!): 18.0 [ME]   1443 eGFR: 48 [ME]   1443 Troponin I: 0.020 [ME]   1454 Leukocyte Esterase, UA: Negative [ME]   1454 NITRITE UA: Negative [ME]   1454 Blood, UA: Negative [ME]   1454 Ketones, UA(!): 15 [ME]   1508 Cannabinoids, Urine(!): Positive [ME]   1559 Discussed the case with Dr. Jackosn from hospital medicine who will admit the patient.  [JR]      ED Course User Index  [JR] Main Amezquita DO  [ME] Catalina Montalvo PA                           Clinical Impression:  Final diagnoses:  [R07.89] Chest tightness  [R11.2] Nausea and vomiting, unspecified vomiting type (Primary)  [E87.6] Hypokalemia  [E87.1] Hyponatremia  [F41.9] Anxiety  [R11.2, F12.90] Cannabinoid hyperemesis syndrome  [R94.31] Prolonged Q-T interval on ECG          ED Disposition Condition    Observation Stable                Catalina Montalvo PA  02/01/25 1539       Catalina Montalvo PA  02/01/25 1727       Catalina Montalvo PA  02/01/25 1725

## 2025-02-01 NOTE — H&P
[facility]    [facility]      Patient Name: Christal Dixon  MRN: [id number]  Admission Date: 2/1/2025  Attending Physician: Yefri Jackson MD  Primary Care Provider: Sebastián Munoz MD         Patient information was obtained from patient and ER records.     Subjective:     Principal Problem:<principal problem not specified>    Chief Complaint:   Chief Complaint   Patient presents with    Nausea     N/V x 5 days - tx as GERD by PCP since that time - retching without emesis today  - Protonix , phenergan Rx        HPI:   49 year old woman with history of MDD/Anxiety, fibromyalgia, HTN presented for intractable nausea/vomiting. Patient says that she has been throwing up consistently for the last week. Patient smokes THC once in a while but not regularly. Usually gets episodes about once a year that require hospitalization. Hot showers improve symptoms. Patient denies any blood in stool or emesis. Denies fevers or chills.     ED course: BC 12.19, K 2.4, Na 126, Cr 1.37, UA positive for THC, CXR was normal     Past Medical History:   Diagnosis Date    Anxiety disorder, unspecified     Depression     Fibromyalgia syndrome     Gastro-esophageal reflux disease without esophagitis     Hypertension        Past Surgical History:   Procedure Laterality Date    knee sx      SINUS SURGERY         Review of patient's allergies indicates:  No Known Allergies    No current facility-administered medications on file prior to encounter.     Current Outpatient Medications on File Prior to Encounter   Medication Sig    esomeprazole (NEXIUM) 40 MG capsule Take 1 capsule (40 mg total) by mouth once daily.    metoclopramide HCl (REGLAN) 10 MG tablet Take 1 tablet (10 mg total) by mouth every 6 (six) hours.    ondansetron (ZOFRAN-ODT) 8 MG TbDL Take 1 tablet (8 mg total) by mouth every 6 (six) hours as needed (nausea/vomiting).    promethazine (PHENERGAN) 25 MG suppository Place 1 suppository (25 mg  total) rectally every 6 (six) hours as needed for Nausea.    promethazine (PHENERGAN) 25 MG suppository Place 1 suppository (25 mg total) rectally every 6 (six) hours as needed for Nausea.    traMADoL (ULTRAM) 50 mg tablet Take 1 tablet (50 mg total) by mouth every 4 (four) hours as needed for Pain.     Family History    None       Tobacco Use    Smoking status: Never    Smokeless tobacco: Never   Substance and Sexual Activity    Alcohol use: Not Currently    Drug use: Never    Sexual activity: Not on file     Review of Systems   All other systems reviewed and are negative.    Objective:     Vital Signs (Most Recent):  Temp: 98.2 °F (36.8 °C) (02/01/25 1555)  Pulse: 108 (02/01/25 1555)  Resp: 18 (02/01/25 1555)  BP: (!) 140/88 (02/01/25 1555)  SpO2: 98 % (02/01/25 1555) Vital Signs (24h Range):  Temp:  [98.2 °F (36.8 °C)-98.4 °F (36.9 °C)] 98.2 °F (36.8 °C)  Pulse:  [102-113] 108  Resp:  [18-20] 18  SpO2:  [98 %-100 %] 98 %  BP: (129-141)/(73-92) 140/88     Weight: 68 kg (150 lb)  Body mass index is 25.75 kg/m².    Physical Exam  Vitals reviewed. Exam conducted with a chaperone present.   Constitutional:       Appearance: Normal appearance.   HENT:      Head: Normocephalic and atraumatic.      Nose: Nose normal.      Mouth/Throat:      Mouth: Mucous membranes are moist.      Pharynx: Oropharynx is clear.   Eyes:      Conjunctiva/sclera: Conjunctivae normal.      Pupils: Pupils are equal, round, and reactive to light.   Cardiovascular:      Rate and Rhythm: Normal rate.   Pulmonary:      Effort: Pulmonary effort is normal.      Breath sounds: Normal breath sounds.   Abdominal:      General: Abdomen is flat.   Musculoskeletal:      Cervical back: Normal range of motion and neck supple.   Skin:     General: Skin is warm.   Neurological:      Mental Status: She is alert. Mental status is at baseline.           CRANIAL NERVES     CN III, IV, VI   Pupils are equal, round, and reactive to light.      Significant Labs: All  pertinent labs within the past 24 hours have been reviewed.  Recent Lab Results         02/01/25  1407   02/01/25  1343        Phencyclidine Negative         Albumin/Globulin Ratio   2.0       Albumin   5.2       ALP   61       ALT   28       Amphetamines, Urine Negative         Anion Gap   18.0       Appearance, UA Clear         AST   32       Barbituates, Urine Negative         Baso #   0.02       Basophil %   0.2       Benzodiazepine, Urine Negative         Bilirubin (UA) Negative         BILIRUBIN TOTAL   1.4       BUN   33       BUN/CREAT RATIO   24       Calcium   9.2       Cannabinoids, Urine Positive         Chloride   83       CO2   25       Cocaine, Urine Negative         Color, UA Yellow         Creatinine   1.37       eGFR   48  Comment:                      EGFR INTERPRETATION    Beginning 8/15/22 we are reporting the eGFRcr calculation as recommended by the National Kid[employer]. The eGFRcr equation has similar overall performance characteristics to the older equation, but the values may differ by more than 10% particularly at higher values of eGFRcr and younger adult ages.    NKF stages of chronic kidney disease (CKD)  Stage 1: Kidney damage with normal or increased eGFR (>90 mL/min/1.73 m^2)  Stage 2: Mild reduction in GFR (60-89 mL/min/1.73 m^2)  Stage 3a: Moderate reduction in GFR (45-59 mL/min/1.73 m^2)  Stage 3b: Moderate reduction in GFR (30-44 mL/min/1.73 m^2)  Stage 4: Severe reduction in GFR (15-29 mL/min/1.73 m^2)  Stage 5: Kidney failure (GFR <15 mL/min/1.73 m^2)      Estimated GFR calculated using the CKD-EPI creatinine (2021) equation.       Eos #   0.01       Eos %   0.1       Globulin, Total   2.6       Glucose   96       Glucose, UA Negative         Hematocrit   39.6       Hemoglobin   14.8       Immature Grans (Abs)   0.05       Immature Granulocytes   0.4       Ketones, UA 15         Leukocyte Esterase, UA Negative         Lipase   200       Lymph #   2.03       LYMPH %   16.7        Magnesium    2.50       MCH   31.4       MCHC   37.4       MCV   84.1       Methadone Screen, Urine Negative         Mono #   1.50       Mono %   12.3       MPV   8.0       Neut #   8.58       Neut %   70.3       NITRITE UA Negative         nRBC   0.0       Blood, UA Negative         Opiates, Urine Negative         pH, UA 6.0         pH, Urine 6.0         Platelet Count   561       Potassium   2.4       PROTEIN TOTAL   7.8       Protein, UA Negative         RBC   4.71       RDW   11.1       Sodium   126       Specific Gravity,UA 1.015         Troponin I   0.020       Urobilinogen, UA 0.2         WBC   12.19               Significant Imaging: I have reviewed all pertinent imaging results/findings within the past 24 hours.  I have reviewed and interpreted all pertinent imaging results/findings within the past 24 hours.    Assessment/Plan:     Active Diagnoses:    Diagnosis Date Noted POA    Hypokalemia [E87.6] 02/01/2025 Unknown    Intractable nausea and vomiting [R11.2] 02/01/2025 Unknown      Problems Resolved During this Admission:     VTE Risk Mitigation (From admission, onward)      None          *Intractable nausea/vomiting  *Cyclical vomiting syndrome  *Hypokalemia  - Cardiac monitoring  - CXR no acute abnormality  - Replete potassium  - Check BMP this evening  - Control nausea, ativan .5 mg PRN for anxiety/nausea  - Pain control     *Prolonged QT  - Holding on QT prolonging agents  - Ativan PRN for nausea/vomiting    *MDD/Anxiety  - Continue home cymbalta    *ZEESHAN  -Likely pre-renal vs post intrarenal  - Daily BMPs   - Avoid nephrotoxic agents  - MIVF NS     *Leukocytosis  - Likely reactive, continue to monitor    Code Status: Full Code  Diet: regular  DVT PPX: heparin SubQ  POS: <2night stay for observation    Services provided via two way audio/visual telecommunication  Provider location: [place], [place]  Patient location: MICHELET Welch MD  Department of Hospital Medicine   [facility]Emergency Dept

## 2025-02-02 VITALS
DIASTOLIC BLOOD PRESSURE: 90 MMHG | SYSTOLIC BLOOD PRESSURE: 146 MMHG | OXYGEN SATURATION: 99 % | HEART RATE: 90 BPM | TEMPERATURE: 98 F | HEIGHT: 64 IN | WEIGHT: 135.13 LBS | BODY MASS INDEX: 23.07 KG/M2 | RESPIRATION RATE: 18 BRPM

## 2025-02-02 PROBLEM — N17.9 AKI (ACUTE KIDNEY INJURY): Status: ACTIVE | Noted: 2025-02-02

## 2025-02-02 PROBLEM — R94.31 PROLONGED Q-T INTERVAL ON ECG: Status: ACTIVE | Noted: 2025-02-02

## 2025-02-02 PROBLEM — E87.1 HYPONATREMIA: Status: ACTIVE | Noted: 2025-02-02

## 2025-02-02 PROBLEM — R11.15 CYCLIC VOMITING SYNDROME: Status: ACTIVE | Noted: 2025-02-02

## 2025-02-02 LAB
ALBUMIN SERPL-MCNC: 4.1 G/DL (ref 3.4–5)
ALBUMIN/GLOB SERPL: 2 RATIO
ALP SERPL-CCNC: 49 UNIT/L (ref 50–144)
ALT SERPL-CCNC: 20 UNIT/L (ref 1–45)
ANION GAP SERPL CALC-SCNC: 7 MEQ/L (ref 2–13)
AST SERPL-CCNC: 22 UNIT/L (ref 14–36)
BASOPHILS # BLD AUTO: 0.03 X10(3)/MCL (ref 0.01–0.08)
BASOPHILS NFR BLD AUTO: 0.3 % (ref 0.1–1.2)
BILIRUB SERPL-MCNC: 0.7 MG/DL (ref 0–1)
BUN SERPL-MCNC: 22 MG/DL (ref 7–20)
CALCIUM SERPL-MCNC: 8.9 MG/DL (ref 8.4–10.2)
CHLORIDE SERPL-SCNC: 92 MMOL/L (ref 98–110)
CO2 SERPL-SCNC: 33 MMOL/L (ref 21–32)
CREAT SERPL-MCNC: 1.01 MG/DL (ref 0.66–1.25)
CREAT/UREA NIT SERPL: 22 (ref 12–20)
EOSINOPHIL # BLD AUTO: 0.09 X10(3)/MCL (ref 0.04–0.36)
EOSINOPHIL NFR BLD AUTO: 1 % (ref 0.7–7)
ERYTHROCYTE [DISTWIDTH] IN BLOOD BY AUTOMATED COUNT: 11.2 % (ref 11–14.5)
GFR SERPLBLD CREATININE-BSD FMLA CKD-EPI: 69 ML/MIN/1.73/M2
GLOBULIN SER-MCNC: 2.1 GM/DL (ref 2–3.9)
GLUCOSE SERPL-MCNC: 74 MG/DL (ref 70–115)
HCT VFR BLD AUTO: 37.8 % (ref 36–48)
HGB BLD-MCNC: 13.5 G/DL (ref 11.8–16)
IMM GRANULOCYTES # BLD AUTO: 0.04 X10(3)/MCL (ref 0–0.03)
IMM GRANULOCYTES NFR BLD AUTO: 0.4 % (ref 0–0.5)
LYMPHOCYTES # BLD AUTO: 3.35 X10(3)/MCL (ref 1.16–3.74)
LYMPHOCYTES NFR BLD AUTO: 37 % (ref 20–55)
MCH RBC QN AUTO: 30.5 PG (ref 27–34)
MCHC RBC AUTO-ENTMCNC: 35.7 G/DL (ref 31–37)
MCV RBC AUTO: 85.3 FL (ref 79–99)
MONOCYTES # BLD AUTO: 1.44 X10(3)/MCL (ref 0.24–0.36)
MONOCYTES NFR BLD AUTO: 15.9 % (ref 4.7–12.5)
NEUTROPHILS # BLD AUTO: 4.1 X10(3)/MCL (ref 1.56–6.13)
NEUTROPHILS NFR BLD AUTO: 45.4 % (ref 37–73)
NRBC BLD AUTO-RTO: 0 %
PLATELET # BLD AUTO: 443 X10(3)/MCL (ref 140–371)
PMV BLD AUTO: 7.9 FL (ref 9.4–12.4)
POTASSIUM SERPL-SCNC: 3.6 MMOL/L (ref 3.5–5.1)
PROT SERPL-MCNC: 6.2 GM/DL (ref 6.3–8.2)
RBC # BLD AUTO: 4.43 X10(6)/MCL (ref 4–5.1)
SODIUM SERPL-SCNC: 132 MMOL/L (ref 136–145)
WBC # BLD AUTO: 9.05 X10(3)/MCL (ref 4–11.5)

## 2025-02-02 PROCEDURE — G0378 HOSPITAL OBSERVATION PER HR: HCPCS

## 2025-02-02 PROCEDURE — 85025 COMPLETE CBC W/AUTO DIFF WBC: CPT | Performed by: INTERNAL MEDICINE

## 2025-02-02 PROCEDURE — 25000003 PHARM REV CODE 250: Performed by: INTERNAL MEDICINE

## 2025-02-02 PROCEDURE — 93010 ELECTROCARDIOGRAM REPORT: CPT | Mod: ,,, | Performed by: INTERNAL MEDICINE

## 2025-02-02 PROCEDURE — 80053 COMPREHEN METABOLIC PANEL: CPT | Performed by: INTERNAL MEDICINE

## 2025-02-02 PROCEDURE — 94761 N-INVAS EAR/PLS OXIMETRY MLT: CPT

## 2025-02-02 PROCEDURE — 36415 COLL VENOUS BLD VENIPUNCTURE: CPT | Performed by: INTERNAL MEDICINE

## 2025-02-02 PROCEDURE — 93005 ELECTROCARDIOGRAM TRACING: CPT

## 2025-02-02 PROCEDURE — 63600175 PHARM REV CODE 636 W HCPCS: Performed by: INTERNAL MEDICINE

## 2025-02-02 PROCEDURE — 96372 THER/PROPH/DIAG INJ SC/IM: CPT | Performed by: INTERNAL MEDICINE

## 2025-02-02 RX ADMIN — DULOXETINE HYDROCHLORIDE 60 MG: 30 CAPSULE, DELAYED RELEASE ORAL at 09:02

## 2025-02-02 RX ADMIN — HEPARIN SODIUM 5000 UNITS: 5000 INJECTION, SOLUTION INTRAVENOUS; SUBCUTANEOUS at 05:02

## 2025-02-02 NOTE — HOSPITAL COURSE
02/02/2025 DISCHARGE SUMMARY: pt presented with nausea and vomiting intractable x 5 days and unable to keep anything down, K was found to be low.  She was admitted to observation given iv K and ivf, she has improved and now tolerating diet and ready for d/c home.  She will f/u with PCP DR.Ryan Munoz to recheck the K. Pt had prolonged QTC on EKG thought to be due to hypokalemia.  Prior EKG from 2022 shows , yesterday was 513.  Repeat EKG shows improvement in QT

## 2025-02-02 NOTE — PLAN OF CARE
Problem: Adult Inpatient Plan of Care  Goal: Plan of Care Review  Outcome: Progressing  Goal: Patient-Specific Goal (Individualized)  Outcome: Progressing  Goal: Absence of Hospital-Acquired Illness or Injury  Outcome: Progressing  Goal: Optimal Comfort and Wellbeing  Outcome: Progressing  Goal: Readiness for Transition of Care  Outcome: Progressing     Problem: Nausea and Vomiting  Goal: Nausea and Vomiting Relief  Outcome: Progressing     Problem: Electrolyte Imbalance  Goal: Electrolyte Balance  Outcome: Progressing

## 2025-02-02 NOTE — PLAN OF CARE
Problem: Adult Inpatient Plan of Care  Goal: Plan of Care Review  Outcome: Met  Goal: Patient-Specific Goal (Individualized)  Outcome: Met  Goal: Absence of Hospital-Acquired Illness or Injury  Outcome: Met  Goal: Optimal Comfort and Wellbeing  Outcome: Met  Goal: Readiness for Transition of Care  Outcome: Met     Problem: Nausea and Vomiting  Goal: Nausea and Vomiting Relief  Outcome: Met     Problem: Electrolyte Imbalance  Goal: Electrolyte Balance  Outcome: Met     Problem: Acute Kidney Injury/Impairment  Goal: Fluid and Electrolyte Balance  Outcome: Met  Goal: Improved Oral Intake  Outcome: Met  Goal: Effective Renal Function  Outcome: Met

## 2025-02-02 NOTE — NURSING
Doctor Maria Esther Martinez was notified of the critical lab K + 2.5. Orders placed in the chart. 40 MEQ PO one times dose. Will continue to monitor.

## 2025-02-02 NOTE — NURSING
Patient given discharge instructions and verbalized understanding. Patient will follow-up with Dr. Sebastián Munoz in 1 week. Patient independently walked out of hospital for discharge. Patient in stable condition upon discharge.

## 2025-02-02 NOTE — ASSESSMENT & PLAN NOTE
Patient's most recent potassium results are listed below.   Recent Labs     02/01/25  1343 02/01/25  2126 02/02/25  0332   K 2.4* 2.5* 3.6     Plan  - Replete potassium per protocol  - Monitor potassium Daily  - Patient's hypokalemia is improving  -

## 2025-02-02 NOTE — DISCHARGE SUMMARY
[facility]/Surg  Hospital Medicine  Discharge Summary      Patient Name: Christal Dixon  MRN: [id number]  [facility]: [id number]  Patient Class: OP- Observation  Admission Date: 2/1/2025  Hospital Length of Stay: 0 days  Discharge Date and Time: 2/2/2025  2:45 PM  Attending Physician: No att. providers found   Discharging Provider: Sheeba Galindo MD  Primary Care Provider: Sebastián Munoz MD    Primary Care Team: Networked reference to record PCT     HPI:   No notes on file    * No surgery found *      Hospital Course:   02/02/2025 DISCHARGE SUMMARY: pt presented with nausea and vomiting intractable x 5 days and unable to keep anything down, K was found to be low.  She was admitted to observation given iv K and ivf, she has improved and now tolerating diet and ready for d/c home.  She will f/u with PCP DR.Ryan Munoz to recheck the K. Pt had prolonged QTC on EKG thought to be due to hypokalemia.  Prior EKG from 2022 shows , yesterday was 513.  Repeat EKG shows improvement in QT     Goals of Care Treatment Preferences:  Code Status: Full Code      SDOH Screening:  The patient was screened for utility difficulties, food insecurity, transport difficulties, housing insecurity, and interpersonal safety and there were no concerns identified this admission.     Consults:   Consults (From admission, onward)          Status Ordering Provider     Inpatient consult to Hospitalist  Once        Provider:  (Not yet assigned)    Acknowledged ANDRÉS WARREN            No notes have been filed under this hospital service.  Service: Hospital Medicine    Final Active Diagnoses:    Diagnosis Date Noted POA    PRINCIPAL PROBLEM:  Hypokalemia [E87.6] 02/01/2025 Yes    ZEESHAN (acute kidney injury) [N17.9] 02/02/2025 Yes    Intractable nausea and vomiting [R11.2] 02/01/2025 Yes    Hyponatremia [E87.1] 02/02/2025 Yes    Cyclic vomiting syndrome [R11.15] 02/02/2025 Yes    Prolonged Q-T interval on ECG [R94.31] 02/02/2025  Yes      Problems Resolved During this Admission:       Discharged Condition: good    Disposition: Home or Self Care    Follow Up:   Follow-up Information       Sebastián Munoz MD Follow up.    Specialty: Family Medicine  Why: Will call patient with appointment  Contact information:  [address] [place] [id number]  [phone]                           Patient Instructions:      Activity as tolerated       Significant Diagnostic Studies: Labs: CMP   Recent Labs   Lab 02/01/25  1343 02/01/25  2126 02/02/25  0332   * 128* 132*   K 2.4* 2.5* 3.6   CL 83* 89* 92*   CO2 25 30 33*   BUN 33* 26* 22*   CREATININE 1.37* 1.22 1.01   CALCIUM 9.2 8.6 8.9   ALBUMIN 5.2*  --  4.1   BILITOT 1.4*  --  0.7   ALKPHOS 61  --  49*   AST 32  --  22   ALT 28  --  20    and CBC   Recent Labs   Lab 02/01/25  1343 02/02/25  0332   WBC 12.19* 9.05   HGB 14.8 13.5   HCT 39.6 37.8   * 443*       Pending Diagnostic Studies:       None           Medications:  Reconciled Home Medications:      Medication List        CONTINUE taking these medications      esomeprazole 40 MG capsule  Commonly known as: NEXIUM  Take 1 capsule (40 mg total) by mouth once daily.     metoclopramide HCl 10 MG tablet  Commonly known as: REGLAN  Take 1 tablet (10 mg total) by mouth every 6 (six) hours.     ondansetron 8 MG Tbdl  Commonly known as: ZOFRAN-ODT  Take 1 tablet (8 mg total) by mouth every 6 (six) hours as needed (nausea/vomiting).     * promethazine 25 MG suppository  Commonly known as: PHENERGAN  Place 1 suppository (25 mg total) rectally every 6 (six) hours as needed for Nausea.     * promethazine 25 MG suppository  Commonly known as: PHENERGAN  Place 1 suppository (25 mg total) rectally every 6 (six) hours as needed for Nausea.     traMADoL 50 mg tablet  Commonly known as: ULTRAM  Take 1 tablet (50 mg total) by mouth every 4 (four) hours as needed for Pain.           * This list has 2 medication(s) that are the same as other  medications prescribed for you. Read the directions carefully, and ask your doctor or other care provider to review them with you.                  Indwelling Lines/Drains at time of discharge:   Lines/Drains/Airways       None                   Time spent on the discharge of patient: 37 minutes    Patient Screened for food insecurity, housing instability, transportation needs, utility difficulties, and interpersonal safety.  No needs identified     Physical Exam  Constitutional:       General: She is not in acute distress.     Appearance: Normal appearance. She is normal weight. She is not ill-appearing.   Cardiovascular:      Rate and Rhythm: Normal rate and regular rhythm.      Pulses: Normal pulses.      Heart sounds: Normal heart sounds.   Pulmonary:      Effort: Pulmonary effort is normal.      Breath sounds: Normal breath sounds.   Abdominal:      General: Abdomen is flat.      Palpations: Abdomen is soft.   Skin:     General: Skin is warm and dry.      Findings: No erythema, lesion or rash.   Neurological:      Mental Status: She is alert.   Psychiatric:         Behavior: Behavior normal.      Comments: I had a face to face encounter with this patient prior to d/c         Sheeba Galindo MD  Department of Hospital Medicine  [facility]/Surg

## 2025-02-03 LAB
OHS QRS DURATION: 68 MS
OHS QRS DURATION: 70 MS
OHS QTC CALCULATION: 459 MS
OHS QTC CALCULATION: 513 MS

## 2025-02-06 ENCOUNTER — PATIENT MESSAGE (OUTPATIENT)
Dept: ADMINISTRATIVE | Facility: CLINIC | Age: 50
End: 2025-02-06
Payer: COMMERCIAL

## 2025-02-06 ENCOUNTER — PATIENT OUTREACH (OUTPATIENT)
Dept: ADMINISTRATIVE | Facility: CLINIC | Age: 50
End: 2025-02-06
Payer: COMMERCIAL

## 2025-02-06 NOTE — PROGRESS NOTES
C3 nurse attempted to contact Christal Dixon for a TCC post hospital discharge follow up call. No answer. Left voicemail with callback information. The patient does not have a scheduled HOSFU appointment, and the pt does not have an [facility] PCP.

## 2025-02-07 NOTE — PROGRESS NOTES
"C3 nurse attempted to contact patient. The following occurred:   C3 nurse attempted to contact Christal Dixon for a TCC post hospital discharge follow up call. The patient is unable to conduct the call @ this time. The patient requested a callback, between 11:15-1:30.     She did note that, "I left a very expensive phone  in the hospital, and never got it back. I went back to get it and it was gone. I assume someone took it?"  "

## 2025-02-07 NOTE — PROGRESS NOTES
3rd attempt-C3 nurse attempted to contact Christal Dixon for a TCC post hospital discharge follow up call. No answer. Left voicemail with callback information, and OOC#. The patient does not have a scheduled HOSFU appointment, and the pt does not have an [facility] PCP.

## 2025-05-21 ENCOUNTER — HOSPITAL ENCOUNTER (OUTPATIENT)
Facility: HOSPITAL | Age: 50
Discharge: HOME | End: 2025-05-22
Attending: EMERGENCY MEDICINE | Admitting: FAMILY MEDICINE
Payer: COMMERCIAL

## 2025-05-21 DIAGNOSIS — R11.2 NAUSEA AND VOMITING, UNSPECIFIED VOMITING TYPE: Primary | ICD-10-CM

## 2025-05-21 DIAGNOSIS — R11.10 VOMITING: ICD-10-CM

## 2025-05-21 LAB
ALBUMIN SERPL-MCNC: 5 G/DL (ref 3.4–5)
ALBUMIN/GLOB SERPL: 1.6 RATIO
ALP SERPL-CCNC: 69 UNIT/L (ref 50–144)
ALT SERPL-CCNC: 26 UNIT/L (ref 1–45)
AMPHET UR QL SCN: NEGATIVE
ANION GAP SERPL CALC-SCNC: 10 MEQ/L (ref 2–13)
AST SERPL-CCNC: 29 UNIT/L (ref 14–36)
B-HCG FREE SERPL-ACNC: 3.87 MIU/ML
BARBITURATE SCN PRESENT UR: NEGATIVE
BASOPHILS # BLD AUTO: 0.06 X10(3)/MCL (ref 0.01–0.08)
BASOPHILS NFR BLD AUTO: 0.4 % (ref 0.1–1.2)
BENZODIAZ UR QL SCN: NEGATIVE
BILIRUB SERPL-MCNC: 1.4 MG/DL (ref 0–1)
BILIRUB UR QL STRIP.AUTO: NEGATIVE
BUN SERPL-MCNC: 26 MG/DL (ref 7–20)
CALCIUM SERPL-MCNC: 9.8 MG/DL (ref 8.4–10.2)
CANNABINOIDS UR QL SCN: POSITIVE
CHLORIDE SERPL-SCNC: 98 MMOL/L (ref 98–110)
CLARITY UR: CLEAR
CO2 SERPL-SCNC: 25 MMOL/L (ref 21–32)
COCAINE UR QL SCN: NEGATIVE
COLOR UR AUTO: YELLOW
CREAT SERPL-MCNC: 1.17 MG/DL (ref 0.66–1.25)
CREAT/UREA NIT SERPL: 22 (ref 12–20)
EOSINOPHIL # BLD AUTO: 0 X10(3)/MCL (ref 0.04–0.36)
EOSINOPHIL NFR BLD AUTO: 0 % (ref 0.7–7)
ERYTHROCYTE [DISTWIDTH] IN BLOOD BY AUTOMATED COUNT: 12.5 % (ref 11–14.5)
GFR SERPLBLD CREATININE-BSD FMLA CKD-EPI: 58 ML/MIN/1.73/M2
GLOBULIN SER-MCNC: 3.2 GM/DL (ref 2–3.9)
GLUCOSE SERPL-MCNC: 148 MG/DL (ref 70–115)
GLUCOSE UR QL STRIP: NEGATIVE
HCT VFR BLD AUTO: 43.7 % (ref 36–48)
HGB BLD-MCNC: 15.6 G/DL (ref 11.8–16)
HGB UR QL STRIP: NEGATIVE
IMM GRANULOCYTES # BLD AUTO: 0.05 X10(3)/MCL (ref 0–0.03)
IMM GRANULOCYTES NFR BLD AUTO: 0.4 % (ref 0–0.5)
KETONES UR QL STRIP: 15
LEUKOCYTE ESTERASE UR QL STRIP: NEGATIVE
LIPASE SERPL-CCNC: 137 U/L (ref 23–300)
LYMPHOCYTES # BLD AUTO: 0.97 X10(3)/MCL (ref 1.16–3.74)
LYMPHOCYTES NFR BLD AUTO: 6.8 % (ref 20–55)
MAGNESIUM SERPL-MCNC: 2 MG/DL (ref 1.8–2.4)
MCH RBC QN AUTO: 30.3 PG (ref 27–34)
MCHC RBC AUTO-ENTMCNC: 35.7 G/DL (ref 31–37)
MCV RBC AUTO: 84.9 FL (ref 79–99)
METHADONE UR QL SCN: NEGATIVE
MONOCYTES # BLD AUTO: 0.65 X10(3)/MCL (ref 0.24–0.36)
MONOCYTES NFR BLD AUTO: 4.6 % (ref 4.7–12.5)
NEUTROPHILS # BLD AUTO: 12.54 X10(3)/MCL (ref 1.56–6.13)
NEUTROPHILS NFR BLD AUTO: 87.8 % (ref 37–73)
NITRITE UR QL STRIP: NEGATIVE
NRBC BLD AUTO-RTO: 0 %
OHS QRS DURATION: 74 MS
OHS QTC CALCULATION: 465 MS
OPIATES UR QL SCN: NEGATIVE
PCP UR QL: NEGATIVE
PH UR STRIP: 6 [PH]
PH UR: 6 [PH] (ref 5–8)
PLATELET # BLD AUTO: 416 X10(3)/MCL (ref 140–371)
PMV BLD AUTO: 9.3 FL (ref 9.4–12.4)
POTASSIUM SERPL-SCNC: 2.4 MMOL/L (ref 3.5–5.1)
PROT SERPL-MCNC: 8.2 GM/DL (ref 6.3–8.2)
PROT UR QL STRIP: NEGATIVE
RBC # BLD AUTO: 5.15 X10(6)/MCL (ref 4–5.1)
SODIUM SERPL-SCNC: 133 MMOL/L (ref 136–145)
SP GR UR STRIP.AUTO: 1.01 (ref 1–1.03)
UROBILINOGEN UR STRIP-ACNC: 0.2
WBC # BLD AUTO: 14.27 X10(3)/MCL (ref 4–11.5)

## 2025-05-21 PROCEDURE — 96361 HYDRATE IV INFUSION ADD-ON: CPT

## 2025-05-21 PROCEDURE — 25000003 PHARM REV CODE 250: Performed by: INTERNAL MEDICINE

## 2025-05-21 PROCEDURE — 80307 DRUG TEST PRSMV CHEM ANLYZR: CPT | Performed by: EMERGENCY MEDICINE

## 2025-05-21 PROCEDURE — 25000003 PHARM REV CODE 250: Performed by: EMERGENCY MEDICINE

## 2025-05-21 PROCEDURE — 85025 COMPLETE CBC W/AUTO DIFF WBC: CPT | Performed by: EMERGENCY MEDICINE

## 2025-05-21 PROCEDURE — G0378 HOSPITAL OBSERVATION PER HR: HCPCS

## 2025-05-21 PROCEDURE — 25000003 PHARM REV CODE 250: Performed by: FAMILY MEDICINE

## 2025-05-21 PROCEDURE — 96374 THER/PROPH/DIAG INJ IV PUSH: CPT

## 2025-05-21 PROCEDURE — 83735 ASSAY OF MAGNESIUM: CPT | Performed by: FAMILY MEDICINE

## 2025-05-21 PROCEDURE — 94761 N-INVAS EAR/PLS OXIMETRY MLT: CPT

## 2025-05-21 PROCEDURE — 63600175 PHARM REV CODE 636 W HCPCS: Performed by: EMERGENCY MEDICINE

## 2025-05-21 PROCEDURE — 80053 COMPREHEN METABOLIC PANEL: CPT | Performed by: EMERGENCY MEDICINE

## 2025-05-21 PROCEDURE — 36415 COLL VENOUS BLD VENIPUNCTURE: CPT | Performed by: EMERGENCY MEDICINE

## 2025-05-21 PROCEDURE — 96376 TX/PRO/DX INJ SAME DRUG ADON: CPT

## 2025-05-21 PROCEDURE — 84702 CHORIONIC GONADOTROPIN TEST: CPT | Performed by: EMERGENCY MEDICINE

## 2025-05-21 PROCEDURE — 99285 EMERGENCY DEPT VISIT HI MDM: CPT | Mod: 25

## 2025-05-21 PROCEDURE — 93010 ELECTROCARDIOGRAM REPORT: CPT | Mod: ,,, | Performed by: INTERNAL MEDICINE

## 2025-05-21 PROCEDURE — 83690 ASSAY OF LIPASE: CPT | Performed by: EMERGENCY MEDICINE

## 2025-05-21 PROCEDURE — 81003 URINALYSIS AUTO W/O SCOPE: CPT | Performed by: EMERGENCY MEDICINE

## 2025-05-21 PROCEDURE — 96375 TX/PRO/DX INJ NEW DRUG ADDON: CPT

## 2025-05-21 PROCEDURE — 93005 ELECTROCARDIOGRAM TRACING: CPT

## 2025-05-21 RX ORDER — POTASSIUM CHLORIDE 7.45 MG/ML
10 INJECTION INTRAVENOUS
Status: COMPLETED | OUTPATIENT
Start: 2025-05-21 | End: 2025-05-21

## 2025-05-21 RX ORDER — FUROSEMIDE 40 MG/1
40 TABLET ORAL
COMMUNITY

## 2025-05-21 RX ORDER — POTASSIUM CHLORIDE 20 MEQ/1
40 TABLET, EXTENDED RELEASE ORAL 3 TIMES DAILY
Status: DISCONTINUED | OUTPATIENT
Start: 2025-05-21 | End: 2025-05-22 | Stop reason: HOSPADM

## 2025-05-21 RX ORDER — TRAZODONE HYDROCHLORIDE 150 MG/1
300 TABLET ORAL NIGHTLY
COMMUNITY

## 2025-05-21 RX ORDER — CLONAZEPAM 1 MG/1
1 TABLET ORAL 2 TIMES DAILY PRN
COMMUNITY
Start: 2025-05-21

## 2025-05-21 RX ORDER — ALUMINUM HYDROXIDE, MAGNESIUM HYDROXIDE, AND SIMETHICONE 1200; 120; 1200 MG/30ML; MG/30ML; MG/30ML
30 SUSPENSION ORAL ONCE
Status: COMPLETED | OUTPATIENT
Start: 2025-05-21 | End: 2025-05-21

## 2025-05-21 RX ORDER — FAMOTIDINE 10 MG/ML
20 INJECTION, SOLUTION INTRAVENOUS
Status: COMPLETED | OUTPATIENT
Start: 2025-05-21 | End: 2025-05-21

## 2025-05-21 RX ORDER — LORAZEPAM 2 MG/ML
0.5 INJECTION INTRAMUSCULAR
Status: COMPLETED | OUTPATIENT
Start: 2025-05-21 | End: 2025-05-21

## 2025-05-21 RX ORDER — AMLODIPINE BESYLATE 10 MG/1
10 TABLET ORAL DAILY
COMMUNITY

## 2025-05-21 RX ORDER — SODIUM CHLORIDE 0.9 % (FLUSH) 0.9 %
10 SYRINGE (ML) INJECTION
Status: DISCONTINUED | OUTPATIENT
Start: 2025-05-21 | End: 2025-05-22 | Stop reason: HOSPADM

## 2025-05-21 RX ORDER — LIDOCAINE HYDROCHLORIDE 20 MG/ML
15 SOLUTION OROPHARYNGEAL ONCE
Status: COMPLETED | OUTPATIENT
Start: 2025-05-21 | End: 2025-05-21

## 2025-05-21 RX ORDER — PANTOPRAZOLE SODIUM 40 MG/1
40 TABLET, DELAYED RELEASE ORAL DAILY
Status: DISCONTINUED | OUTPATIENT
Start: 2025-05-21 | End: 2025-05-22 | Stop reason: HOSPADM

## 2025-05-21 RX ORDER — PROCHLORPERAZINE EDISYLATE 5 MG/ML
5 INJECTION INTRAMUSCULAR; INTRAVENOUS
Status: COMPLETED | OUTPATIENT
Start: 2025-05-21 | End: 2025-05-21

## 2025-05-21 RX ORDER — HYDROCHLOROTHIAZIDE 25 MG/1
25 TABLET ORAL
COMMUNITY

## 2025-05-21 RX ORDER — TRAZODONE HYDROCHLORIDE 100 MG/1
300 TABLET ORAL NIGHTLY
Status: DISCONTINUED | OUTPATIENT
Start: 2025-05-21 | End: 2025-05-22 | Stop reason: HOSPADM

## 2025-05-21 RX ORDER — POTASSIUM CHLORIDE 20 MEQ/1
40 TABLET, EXTENDED RELEASE ORAL ONCE
Status: COMPLETED | OUTPATIENT
Start: 2025-05-21 | End: 2025-05-21

## 2025-05-21 RX ORDER — DULOXETIN HYDROCHLORIDE 60 MG/1
60 CAPSULE, DELAYED RELEASE ORAL DAILY
COMMUNITY

## 2025-05-21 RX ORDER — PROCHLORPERAZINE EDISYLATE 5 MG/ML
5 INJECTION INTRAMUSCULAR; INTRAVENOUS EVERY 6 HOURS PRN
Status: DISCONTINUED | OUTPATIENT
Start: 2025-05-21 | End: 2025-05-22 | Stop reason: HOSPADM

## 2025-05-21 RX ORDER — ONDANSETRON HYDROCHLORIDE 2 MG/ML
4 INJECTION, SOLUTION INTRAVENOUS
Status: COMPLETED | OUTPATIENT
Start: 2025-05-21 | End: 2025-05-21

## 2025-05-21 RX ORDER — LOSARTAN POTASSIUM 100 MG/1
100 TABLET ORAL DAILY
COMMUNITY

## 2025-05-21 RX ORDER — ACETAMINOPHEN 325 MG/1
650 TABLET ORAL EVERY 8 HOURS PRN
Status: DISCONTINUED | OUTPATIENT
Start: 2025-05-21 | End: 2025-05-22 | Stop reason: HOSPADM

## 2025-05-21 RX ORDER — PROMETHAZINE HYDROCHLORIDE 25 MG/1
25 TABLET ORAL
COMMUNITY

## 2025-05-21 RX ORDER — SODIUM CHLORIDE 9 MG/ML
1000 INJECTION, SOLUTION INTRAVENOUS CONTINUOUS
Status: ACTIVE | OUTPATIENT
Start: 2025-05-21 | End: 2025-05-22

## 2025-05-21 RX ORDER — ONDANSETRON HYDROCHLORIDE 2 MG/ML
4 INJECTION, SOLUTION INTRAVENOUS EVERY 8 HOURS PRN
Status: DISCONTINUED | OUTPATIENT
Start: 2025-05-21 | End: 2025-05-22 | Stop reason: HOSPADM

## 2025-05-21 RX ORDER — DEXTROAMPHETAMINE SACCHARATE, AMPHETAMINE ASPARTATE MONOHYDRATE, DEXTROAMPHETAMINE SULFATE AND AMPHETAMINE SULFATE 7.5; 7.5; 7.5; 7.5 MG/1; MG/1; MG/1; MG/1
30 CAPSULE, EXTENDED RELEASE ORAL
COMMUNITY

## 2025-05-21 RX ORDER — ONDANSETRON 4 MG/1
4 TABLET, FILM COATED ORAL
COMMUNITY

## 2025-05-21 RX ADMIN — LIDOCAINE HYDROCHLORIDE 15 ML: 20 SOLUTION ORAL at 01:05

## 2025-05-21 RX ADMIN — LORAZEPAM 0.5 MG: 2 INJECTION INTRAMUSCULAR; INTRAVENOUS at 01:05

## 2025-05-21 RX ADMIN — ONDANSETRON 4 MG: 2 INJECTION INTRAMUSCULAR; INTRAVENOUS at 06:05

## 2025-05-21 RX ADMIN — FAMOTIDINE 20 MG: 10 INJECTION, SOLUTION INTRAVENOUS at 01:05

## 2025-05-21 RX ADMIN — TRAZODONE HYDROCHLORIDE 300 MG: 100 TABLET ORAL at 08:05

## 2025-05-21 RX ADMIN — POTASSIUM CHLORIDE 40 MEQ: 1500 TABLET, EXTENDED RELEASE ORAL at 08:05

## 2025-05-21 RX ADMIN — POTASSIUM CHLORIDE 10 MEQ: 7.46 INJECTION, SOLUTION INTRAVENOUS at 04:05

## 2025-05-21 RX ADMIN — PANTOPRAZOLE SODIUM 40 MG: 40 TABLET, DELAYED RELEASE ORAL at 04:05

## 2025-05-21 RX ADMIN — POTASSIUM CHLORIDE 40 MEQ: 1500 TABLET, EXTENDED RELEASE ORAL at 02:05

## 2025-05-21 RX ADMIN — SODIUM CHLORIDE 1000 ML: 9 INJECTION, SOLUTION INTRAVENOUS at 03:05

## 2025-05-21 RX ADMIN — SODIUM CHLORIDE 1000 ML: 9 INJECTION, SOLUTION INTRAVENOUS at 01:05

## 2025-05-21 RX ADMIN — ONDANSETRON 4 MG: 2 INJECTION INTRAMUSCULAR; INTRAVENOUS at 01:05

## 2025-05-21 RX ADMIN — POTASSIUM CHLORIDE 10 MEQ: 7.46 INJECTION, SOLUTION INTRAVENOUS at 02:05

## 2025-05-21 RX ADMIN — SODIUM CHLORIDE 1000 ML: 9 INJECTION, SOLUTION INTRAVENOUS at 04:05

## 2025-05-21 RX ADMIN — ALUMINUM HYDROXIDE, MAGNESIUM HYDROXIDE, AND DIMETHICONE 30 ML: 200; 20; 200 SUSPENSION ORAL at 01:05

## 2025-05-21 RX ADMIN — PROCHLORPERAZINE EDISYLATE 5 MG: 5 INJECTION INTRAMUSCULAR; INTRAVENOUS at 08:05

## 2025-05-21 RX ADMIN — PROCHLORPERAZINE EDISYLATE 5 MG: 5 INJECTION INTRAMUSCULAR; INTRAVENOUS at 01:05

## 2025-05-21 RX ADMIN — POTASSIUM CHLORIDE 10 MEQ: 7.46 INJECTION, SOLUTION INTRAVENOUS at 05:05

## 2025-05-21 NOTE — SUBJECTIVE & OBJECTIVE
Past Medical History:   Diagnosis Date    Anxiety disorder, unspecified     Depression     Fibromyalgia syndrome     Gastro-esophageal reflux disease without esophagitis     Hypertension        Past Surgical History:   Procedure Laterality Date    knee sx      SINUS SURGERY         Review of patient's allergies indicates:  No Known Allergies    No current facility-administered medications on file prior to encounter.     Current Outpatient Medications on File Prior to Encounter   Medication Sig    esomeprazole (NEXIUM) 40 MG capsule Take 1 capsule (40 mg total) by mouth once daily.    metoclopramide HCl (REGLAN) 10 MG tablet Take 1 tablet (10 mg total) by mouth every 6 (six) hours.    ondansetron (ZOFRAN-ODT) 8 MG TbDL Take 1 tablet (8 mg total) by mouth every 6 (six) hours as needed (nausea/vomiting).    promethazine (PHENERGAN) 25 MG suppository Place 1 suppository (25 mg total) rectally every 6 (six) hours as needed for Nausea.    promethazine (PHENERGAN) 25 MG suppository Place 1 suppository (25 mg total) rectally every 6 (six) hours as needed for Nausea.    traMADoL (ULTRAM) 50 mg tablet Take 1 tablet (50 mg total) by mouth every 4 (four) hours as needed for Pain.     Family History    None       Tobacco Use    Smoking status: Never    Smokeless tobacco: Never   Substance and Sexual Activity    Alcohol use: Not Currently    Drug use: Not Currently    Sexual activity: Not on file     Review of Systems   Constitutional:  Negative for activity change, appetite change and fever.   Respiratory:  Negative for chest tightness, shortness of breath and wheezing.    Cardiovascular:  Negative for chest pain.   Gastrointestinal:  Positive for abdominal pain (resoirt weird feeling in stomach not necessarily pain), nausea and vomiting. Negative for constipation and diarrhea.   Genitourinary:  Negative for dysuria.   Skin:  Negative for rash and wound.   Neurological:  Negative for tremors and headaches.     Objective:      Vital Signs (Most Recent):  Temp: 98.1 °F (36.7 °C) (05/21/25 1409)  Pulse: 86 (05/21/25 1409)  Resp: 16 (05/21/25 1409)  BP: (!) 151/78 (05/21/25 1409)  SpO2: 99 % (05/21/25 1409) Vital Signs (24h Range):  Temp:  [98.1 °F (36.7 °C)-98.4 °F (36.9 °C)] 98.1 °F (36.7 °C)  Pulse:  [] 86  Resp:  [16-18] 16  SpO2:  [99 %] 99 %  BP: (111-151)/(78-88) 151/78     Weight: 67.6 kg (149 lb)  Body mass index is 25.58 kg/m².     Physical Exam  Vitals and nursing note reviewed. Exam conducted with a chaperone present.   Constitutional:       General: She is in acute distress.      Appearance: Normal appearance. She is normal weight. She is ill-appearing. She is not toxic-appearing.   HENT:      Head: Normocephalic and atraumatic.   Eyes:      General: No scleral icterus.     Extraocular Movements: Extraocular movements intact.   Cardiovascular:      Rate and Rhythm: Normal rate and regular rhythm.      Pulses: Normal pulses.      Heart sounds: Normal heart sounds.   Pulmonary:      Effort: Pulmonary effort is normal.      Breath sounds: Normal breath sounds.   Abdominal:      General: Abdomen is flat. Bowel sounds are normal.      Palpations: Abdomen is soft.      Tenderness: There is abdominal tenderness (mild epigastric).   Musculoskeletal:      Right lower leg: No edema.      Left lower leg: No edema.   Skin:     General: Skin is warm and dry.      Capillary Refill: Capillary refill takes less than 2 seconds.      Findings: No erythema, lesion or rash.   Neurological:      General: No focal deficit present.      Mental Status: She is alert and oriented to person, place, and time.   Psychiatric:         Mood and Affect: Mood normal.         Behavior: Behavior normal.         Thought Content: Thought content normal.                Significant Labs: All pertinent labs within the past 24 hours have been reviewed.  BMP:   Recent Labs   Lab 05/21/25  1332   *   *   K 2.4*   CL 98   CO2 25   BUN 26*    CREATININE 1.17   CALCIUM 9.8     CBC:   Recent Labs   Lab 05/21/25  1315   WBC 14.27*   HGB 15.6   HCT 43.7   *       Significant Imaging: I have reviewed all pertinent imaging results/findings within the past 24 hours.

## 2025-05-21 NOTE — HPI
This is a 49-year-old  woman past medical history significant for hypertension, GERD, fibromyalgia, anxiety/depression, cyclic vomiting episodes who presented to the emergency department [facility] today by personal vehicle with a chief complaint of about 24 hours of nausea and vomiting and inability to tolerate p.o. similar to previous cyclical vomiting episodes. She reports that she has been under a lot of emotional stress recently including the high school graduation of her son. She reports that the nausea symptoms started yesterday evening 05/20/2025. She has medicinal marijuana prescription for edibles and she reports she took 1 after the symptoms started to try to help with the nausea, however there was no improvement. She reports this often helps. It did not this time. She has had multiple episodes of vomiting of stomach contents and reports now that she is mostly dry heaving and occasionally having some bile. She has some burning sensation/water brash in her chest that she reports is from vomiting. Pt reports prior work up including scopes has not really found cause for recurrent nausea and vomiting.  No shortness of breath. No lower extremity edema. No fevers or chills or other systemic symptoms. No dysuria or hematuria or flank or CVA pain. Reports no history of kidney stones. No abdominal surgeries. Denies any alcohol use. She reports that last time she had an episode like this she waited for a few days before coming in and ultimately had to be admitted to the hospital overnight. Chart review shows this was in February of 2025 and she had mild ZEESHAN with a creatinine of 1.37 at that time and had hypokalemia of 2.4. She was given IV fluids, IV prochlorperazine, potassium repletion, GI cocktail, p.o. clonazepam and looks like she was discharged after a short observation stay. She reports that these treatments work fairly well last time for her.   Discussed with ERMEVELYNE agree with placing in observation, will  replace K both po and iv, no fuerther vomiting since here, she received 1L IVF in the ER, will bolus another 1 L and give NS at 100cc/hr.

## 2025-05-21 NOTE — H&P
[facility] Medicine  History & Physical    Patient Name: Christal Dixon  MRN: [id number]  Patient Class: OP- Observation  Admission Date: 5/21/2025  Attending Physician: Sheeba Galindo MD  Primary Care Provider: Sebastián Munoz MD         Patient information was obtained from patient, past medical records, and ER records.     Subjective:     Principal Problem:<principal problem not specified>    Chief Complaint:   Chief Complaint   Patient presents with    Emesis     Vomiting onset since last night, hx of cyclical vomiting. Zofran and phenergan taken this am, no relief.        HPI: This is a 49-year-old  woman past medical history significant for hypertension, GERD, fibromyalgia, anxiety/depression, cyclic vomiting episodes who presented to the emergency department [facility] today by personal vehicle with a chief complaint of about 24 hours of nausea and vomiting and inability to tolerate p.o. similar to previous cyclical vomiting episodes. She reports that she has been under a lot of emotional stress recently including the high school graduation of her son. She reports that the nausea symptoms started yesterday evening 05/20/2025. She has medicinal marijuana prescription for edibles and she reports she took 1 after the symptoms started to try to help with the nausea, however there was no improvement. She reports this often helps. It did not this time. She has had multiple episodes of vomiting of stomach contents and reports now that she is mostly dry heaving and occasionally having some bile. She has some burning sensation/water brash in her chest that she reports is from vomiting. Pt reports prior work up including scopes has not really found cause for recurrent nausea and vomiting.  No shortness of breath. No lower extremity edema. No fevers or chills or other systemic symptoms. No dysuria or hematuria or flank or CVA pain. Reports no history of kidney stones. No  abdominal surgeries. Denies any alcohol use. She reports that last time she had an episode like this she waited for a few days before coming in and ultimately had to be admitted to the hospital overnight. Chart review shows this was in February of 2025 and she had mild ZEESHAN with a creatinine of 1.37 at that time and had hypokalemia of 2.4. She was given IV fluids, IV prochlorperazine, potassium repletion, GI cocktail, p.o. clonazepam and looks like she was discharged after a short observation stay. She reports that these treatments work fairly well last time for her.   Discussed with ERMD agree with placing in observation, will replace K both po and iv, no fuerther vomiting since here, she received 1L IVF in the ER, will bolus another 1 L and give NS at 100cc/hr.      Past Medical History:   Diagnosis Date    Anxiety disorder, unspecified     Depression     Fibromyalgia syndrome     Gastro-esophageal reflux disease without esophagitis     Hypertension        Past Surgical History:   Procedure Laterality Date    knee sx      SINUS SURGERY         Review of patient's allergies indicates:  No Known Allergies    No current facility-administered medications on file prior to encounter.     Current Outpatient Medications on File Prior to Encounter   Medication Sig    esomeprazole (NEXIUM) 40 MG capsule Take 1 capsule (40 mg total) by mouth once daily.    metoclopramide HCl (REGLAN) 10 MG tablet Take 1 tablet (10 mg total) by mouth every 6 (six) hours.    ondansetron (ZOFRAN-ODT) 8 MG TbDL Take 1 tablet (8 mg total) by mouth every 6 (six) hours as needed (nausea/vomiting).    promethazine (PHENERGAN) 25 MG suppository Place 1 suppository (25 mg total) rectally every 6 (six) hours as needed for Nausea.    promethazine (PHENERGAN) 25 MG suppository Place 1 suppository (25 mg total) rectally every 6 (six) hours as needed for Nausea.    traMADoL (ULTRAM) 50 mg tablet Take 1 tablet (50 mg total) by mouth every 4 (four) hours as  needed for Pain.     Family History    None       Tobacco Use    Smoking status: Never    Smokeless tobacco: Never   Substance and Sexual Activity    Alcohol use: Not Currently    Drug use: Not Currently    Sexual activity: Not on file     Review of Systems   Constitutional:  Negative for activity change, appetite change and fever.   Respiratory:  Negative for chest tightness, shortness of breath and wheezing.    Cardiovascular:  Negative for chest pain.   Gastrointestinal:  Positive for abdominal pain (resoirt weird feeling in stomach not necessarily pain), nausea and vomiting. Negative for constipation and diarrhea.   Genitourinary:  Negative for dysuria.   Skin:  Negative for rash and wound.   Neurological:  Negative for tremors and headaches.     Objective:     Vital Signs (Most Recent):  Temp: 98.1 °F (36.7 °C) (05/21/25 1409)  Pulse: 86 (05/21/25 1409)  Resp: 16 (05/21/25 1409)  BP: (!) 151/78 (05/21/25 1409)  SpO2: 99 % (05/21/25 1409) Vital Signs (24h Range):  Temp:  [98.1 °F (36.7 °C)-98.4 °F (36.9 °C)] 98.1 °F (36.7 °C)  Pulse:  [] 86  Resp:  [16-18] 16  SpO2:  [99 %] 99 %  BP: (111-151)/(78-88) 151/78     Weight: 67.6 kg (149 lb)  Body mass index is 25.58 kg/m².     Physical Exam  Vitals and nursing note reviewed. Exam conducted with a chaperone present.   Constitutional:       General: She is in acute distress.      Appearance: Normal appearance. She is normal weight. She is ill-appearing. She is not toxic-appearing.   HENT:      Head: Normocephalic and atraumatic.   Eyes:      General: No scleral icterus.     Extraocular Movements: Extraocular movements intact.   Cardiovascular:      Rate and Rhythm: Normal rate and regular rhythm.      Pulses: Normal pulses.      Heart sounds: Normal heart sounds.   Pulmonary:      Effort: Pulmonary effort is normal.      Breath sounds: Normal breath sounds.   Abdominal:      General: Abdomen is flat. Bowel sounds are normal.      Palpations: Abdomen is soft.       Tenderness: There is abdominal tenderness (mild epigastric).   Musculoskeletal:      Right lower leg: No edema.      Left lower leg: No edema.   Skin:     General: Skin is warm and dry.      Capillary Refill: Capillary refill takes less than 2 seconds.      Findings: No erythema, lesion or rash.   Neurological:      General: No focal deficit present.      Mental Status: She is alert and oriented to person, place, and time.   Psychiatric:         Mood and Affect: Mood normal.         Behavior: Behavior normal.         Thought Content: Thought content normal.                Significant Labs: All pertinent labs within the past 24 hours have been reviewed.  BMP:   Recent Labs   Lab 05/21/25  1332   *   *   K 2.4*   CL 98   CO2 25   BUN 26*   CREATININE 1.17   CALCIUM 9.8     CBC:   Recent Labs   Lab 05/21/25  1315   WBC 14.27*   HGB 15.6   HCT 43.7   *       Significant Imaging: I have reviewed all pertinent imaging results/findings within the past 24 hours.  Assessment/Plan:     Assessment & Plan  Hypokalemia  Patient's most recent potassium results are listed below.   Recent Labs     05/21/25  1332   K 2.4*     Plan  - Replete potassium per protocol  - Monitor potassium Daily  - Patient's hypokalemia is worsening. Will admit to obs for iv and po k  -    Intractable nausea and vomiting  Admit for ivf    VTE Risk Mitigation (From admission, onward)           Ordered     IP VTE LOW RISK PATIENT  Once         05/21/25 1510     Place JENNIFER hose  Until discontinued         05/21/25 1510                       On 05/21/2025, patient should be placed in hospital observation services under my care.         Patient Screened for food insecurity, housing instability, transportation needs, utility difficulties, and interpersonal safety.  No needs identified  Home medications were received by me and reconciled based on the consideration of mental status, ability to tolerate oral medications and side effects. I  will reassess and resume additional home medications as clinically indicated.       Sheeba Galindo MD  Department of Hospital Medicine  [facility]

## 2025-05-21 NOTE — DISCHARGE INSTRUCTIONS
Continue with activity as tolerated.   Be sure to attend follow-up appointment with Dr. Munoz.  Continue advancing diet as tolerated.    When do I need to call the doctor?   Throwing up  Feeling tired  Loose stools  Abnormal heartbeat  Trouble breathing  Chest pain  Cramping or twitching of the muscles  Weakness  Paralysis  Confusion  You develop early signs of fluid loss, such as:  Dark-colored urine.  Dry mouth.  Muscle cramps.  Lack of energy.  Feeling light-headed when you get up.  You have a small amount of blood (less than 1 teaspoon or 5 mL) in your vomit or bowel movements.  You throw up something that looks like coffee grounds.  You have a bowel movement that is black and looks like tar.  You are not able to keep fluids down.  You have a fever of 100.4º F (38°C) or higher or chills that do not go away after a day.  You have new or worsening symptoms.  You should also call your doctor or nurse if you have any signs of dehydration, such as:   Feeling very tired   Being very thirsty, or having a dry mouth or tongue   Muscle cramps   Dizziness   Confusion   Urine that is dark yellow, or not needing to urinate for more than 5 hours  You feel you may harm yourself or someone else.  You can also call a mental health hotline for help.  You have any physical symptoms, such as chest pain, trouble breathing, or severe belly pain, that could be a sign of a serious problem.  If you are short of breath.  If you do not feel like you can be alone.    SATISFACTION SURVEY:        Our goal in the Emergency Department is to always give you outstanding care and exceptional service. You may receive a survey by mail or e-mail in the next week regarding your experience in our ED. We would greatly appreciate your completing and returning the survey. Your feedback provides us with a way to recognize our staff who give very good care and it helps us learn how to improve when your experience was below our aspiration of excellence.      Thank you for trusting us with your care. . .     Dr. Parth Hudr   Emergency Medicine & Internal Medicine

## 2025-05-21 NOTE — Clinical Note
"Christal "Christal" Destiny was seen and treated in our emergency department on 5/21/2025.  She may return to work on 05/26/2025.  A return to work sooner if feeling better     If you have any questions or concerns, please don't hesitate to call.      Parth Hurd MD"

## 2025-05-21 NOTE — ASSESSMENT & PLAN NOTE
Patient's most recent potassium results are listed below.   Recent Labs     05/21/25  1332   K 2.4*     Plan  - Replete potassium per protocol  - Monitor potassium Daily  - Patient's hypokalemia is worsening. Will admit to obs for iv and po k  -

## 2025-05-21 NOTE — ED PROVIDER NOTES
CHIEF COMPLAINT    Chief Complaint   Patient presents with    Emesis     Vomiting onset since last night, hx of cyclical vomiting. Zofran and phenergan taken this am, no relief.           HISTORY OF PRESENT ILLNESS    This is a 49-year-old  woman past medical history significant for hypertension, GERD, fibromyalgia, anxiety/depression, cyclic vomiting episodes who presents to the emergency department by personal vehicle with a chief complaint of about 24 hours of nausea and vomiting and inability to tolerate p.o. similar to previous cyclical vomiting episodes.  She reports that she has been under a lot of emotional stress recently including the high school graduation of her son.  She reports that the nausea symptoms started yesterday evening.  She has medicinal marijuana prescription for edibles and she reports she took 1 after the symptoms started to try to help with the nausea.  She reports this often helps.  It did not this time.  She has had multiple episodes of vomiting of stomach contents and reports now that she is mostly dry heaving and occasionally having some bile.  She has some burning sensation/water brash in her chest that she reports is from vomiting.  No shortness of breath.  No lower extremity edema.  No fevers or chills or other systemic symptoms.  No dysuria or hematuria or flank or CVA pain.  Reports no history of kidney stones.  No abdominal surgeries.  Denies any alcohol use.  She reports that last time she had an episode like this she waited for a few days before coming in and ultimately had to be admitted to the hospital overnight.  Chart review shows this was in February of 2025 and she had mild ZEESHAN with a creatinine of 1.37 at that time and had hypokalemia of 2.4.  She was given IV fluids, IV prochlorperazine, potassium repletion, GI cocktail, p.o. clonazepam and looks like she was discharged after a short observation stay.  She reports that these treatments work fairly well last  "time for her.               REVIEW OF SYSTEMS    All 10 systems reviewed and negative except as marked.    GEN: No fever/chills  HEENT: No sore throat or ear aches.  CV: No chest pain or palpitations  RESP: No cough or shortness of breath  GI:  See HPI  : No dysuria or urinary frequency.  NEURO: No focal weakness, numbness/tingling or dizziness, speech or visual changes.  SKIN: No redness, swelling, burises or rash.  MSK: No joint pain or swelling, no injrueis or deformities.  ENDO: No polyuria, polydypsia.  PSYCH: No depression or anxiety.       PHYSICAL EXAM  Physical Exam   BP (!) 151/78 (BP Location: Right arm, Patient Position: Sitting)   Pulse 86   Temp 98.1 °F (36.7 °C) (Oral)   Resp 16   Ht 5' 4" (1.626 m)   Wt 149 lb (67.6 kg)   SpO2 99%   Breastfeeding No   BMI 25.58 kg/m²   VITALS: Reviewed Triage Vitals and Updated Vitals myself  GEN:  Thin young  woman sitting up in the ER stretcher rocking back and forth, appears anxious and uncomfortable occasionally dry heaving but no actual emesis, cooperative with exam.  HEENT: EOMI, mmm   CV:  Heart rate approximately 100 beats per minute, no murmurs, rubs, gallops. No LE pitting edema. Chest wall nontender.  LUNG: CTAB, no wheezes, rhales, or rhonchi. Nonlabored, no accessory muscle use. Speaking in full sentences.  ABD: Soft, nondistented, normal bowel sounds.  Mild diffuse tenderness to palpation but, no rebound, no guarding, no pulsatile masses.   NEURO: Alert, no obvious facial asymmetry, moving all extremities  MSK: FROM o fall extremities, no obivous deformities  SKIN: No obvious rashes/bruises.  PSYCH:  Anxious appearing and rocking back and forth in the bed, breathing quickly/hyperventilating and occasionally but consolable and intermittently relaxed in the bed.            PAST MEDICAL HISTORY    Past Medical History:   Diagnosis Date    Anxiety disorder, unspecified     Depression     Fibromyalgia syndrome     Gastro-esophageal reflux " disease without esophagitis     Hypertension            FAMILY HISTORY    No family history on file.        SOCIAL HISTORY    Social History     Socioeconomic History    Marital status: Single   Tobacco Use    Smoking status: Never    Smokeless tobacco: Never   Substance and Sexual Activity    Alcohol use: Not Currently    Drug use: Not Currently     Social Drivers of Health     Financial Resource Strain: Low Risk  (2/1/2025)    Overall Financial Resource Strain (CARDIA)     Difficulty of Paying Living Expenses: Not hard at all   Food Insecurity: No Food Insecurity (2/1/2025)    Hunger Vital Sign     Worried About Running Out of Food in the Last Year: Never true     Ran Out of Food in the Last Year: Never true   Transportation Needs: No Transportation Needs (2/1/2025)    TRANSPORTATION NEEDS     Transportation : No   Stress: No Stress Concern Present (2/1/2025)    [employer] [place] [employer] - Occupational Stress Questionnaire     Feeling of Stress : Not at all   Housing Stability: Unknown (2/1/2025)    Housing Stability Vital Sign     Unable to Pay for Housing in the Last Year: No     Homeless in the Last Year: No           SURGICAL HISTORY    Past Surgical History:   Procedure Laterality Date    knee sx      SINUS SURGERY           ALLERGIES    Review of patient's allergies indicates:  No Known Allergies      CURRENT MEDICATIONS    Current Medications[1]                      RADIOLOGY / LABS / MISCELLANEOUS STUDIES:    Imaging Results    None                LABS    Labs Reviewed   COMPREHENSIVE METABOLIC PANEL - Abnormal       Result Value    Sodium 133 (*)     Potassium 2.4 (*)     Chloride 98      CO2 25      Glucose 148 (*)     Blood Urea Nitrogen 26 (*)     Creatinine 1.17      Calcium 9.8      Protein Total 8.2      Albumin 5.0      Globulin 3.2      Albumin/Globulin Ratio 1.6      Bilirubin Total 1.4 (*)     ALP 69      ALT 26      AST 29      eGFR 58      Anion Gap 10.0      BUN/Creatinine  Ratio 22 (*)    CBC WITH DIFFERENTIAL - Abnormal    WBC 14.27 (*)     RBC 5.15 (*)     Hgb 15.6      Hct 43.7      MCV 84.9      MCH 30.3      MCHC 35.7      RDW 12.5      Platelet 416 (*)     MPV 9.3 (*)     Neut % 87.8 (*)     Lymph % 6.8 (*)     Mono % 4.6 (*)     Eos % 0.0 (*)     Basophil % 0.4      Lymph # 0.97 (*)     Neut # 12.54 (*)     Mono # 0.65 (*)     Eos # 0.00 (*)     Baso # 0.06      Imm Gran # 0.05 (*)     Imm Grans % 0.4      NRBC% 0.0     LIPASE - Normal    Lipase Level 137     CBC W/ AUTO DIFFERENTIAL    Narrative:     The following orders were created for panel order CBC auto differential.                  Procedure                               Abnormality         Status                                     ---------                               -----------         ------                                     CBC with Differential[[id number]]       Abnormal            Final result                                                 Please view results for these tests on the individual orders.   HCG, QUANTITATIVE    Beta HCG Quant 3.87      Narrative:     Beta-HCG ref range weeks after implantation (mIU/mL):                   3-4wks 9-130                   4-5wks                    5-6wks [id number]                   6-7wks [id number]                   7-12wks 36784-150500                   12-16wks [id number]                   16-28wks 1400-40122                   20-41wks [phone]   URINALYSIS, REFLEX TO URINE CULTURE   DRUG SCREEN, URINE (BEAKER)            FUTURE APPOINTMENTS    No future appointments.                   Medical Decision Making  Amount and/or Complexity of Data Reviewed  Labs: ordered. Decision-making details documented in ED Course.    Risk  OTC drugs.  Prescription drug management.  Decision regarding hospitalization.        -I reviewed available prior medical records in the EPIC Electronic Medical Record and any paper documents brought by patient, family, EMS staff  and any others available to me.     -Pertinent details are summarized in this document    -All laboratory, radiologic, and EKG studies that were performed in the Emergency Department were a necessary part of the evaluation needed to exclude unstable or  emergent medical conditions. Each of the unique tests during this ER encounter were ordered by myself for workup of this patient. I also reviewed all of the results.     -Patient's prescriptions were reviewed and changes documented.              MY MEDICAL DECISION MAKING and   ASSESSMENT & PLAN FOR ER VISIT:       Unsure of the exact diagnosis at this time but leading differential is cyclical vomiting syndrome episode.  Sounds like emotional stressors contributed greatly.  Marijuana could be contributing as well.  This could be from an organic cause such as viral gastroenteritis or a food-borne illness of some type.  Differential includes ACS atypical presentation but seems less likely.  I think her chest symptoms are likely from emesis.  Differential also includes hepatitis, pancreatitis, cholecystitis, cystitis, renal colic/kidney stone but these seem less likely as well.  Normal bowel movements and no lower abdominal pain make diverticulitis, appendicitis seems less likely but they still are all the differential.  She appears euvolemic at this time but does have some mild tachycardia.  She could have dehydration, electrolyte disturbances as well.  Pregnancy potential but seems less likely.    -IV access   -cardiac monitor   -EKG   -CBC, CMP, lipase  -urinalysis   -hCG   -urine drug screen   -1 L normal saline IV fluid bolus   -4 mg IV Zofran and   -5 mg IV prochlorperazine  -0.5 mg IV Ativan   -when able, GI cocktail p.o.   -I asked nursing staff to create a calm environment with low lighting, low stimulation, comfortable bedding, etc. and encouraged the patient to relax in tried a nap.    -reassess          ED COURSE, UPDATES, and DISPOSITION:     ED Course as  of 05/21/25 1503   Wed May 21, 2025   1327 EKG: Time is 1304.  Rate is 104 beats per minute.  This is sinus tachycardia.  There is no ST elevation but there is some downsloping of the ST segments in all leads.  Similar findings on last EKG from February 2025 when she was having a similar episode.  No concerning ST or T-wave abnormalities to suggest acute ischemia.  No STEMI.  QTC is 465 milliseconds [ZH]   1404 CBC:  WBC 14.27 otherwise benign   CMP:  Potassium 2.4  Lipase 137   Urinalysis and urine drug screen and pregnancy test pending     -will start with 40 mEq of oral potassium repletion as well as 10 mEq IV Q 1 hour x4   -will speak with hospitalist about observation admission since it will take numerous hours to get potassium back to normal and patient is having difficulty tolerating p.o.. [ZH]   1458 Beta HCG Quant: 3.87 [ZH]   1459 Update at 3:00 p.m.:   -the hospitalist  and I discussed the case.  She graciously accepted the patient as an observation admission. [ZH]      ED Course User Index  [ZH] Parth Hurd MD                                 In the ED, the patient received:    Medications   potassium chloride 10 mEq in 100 mL IVPB (10 mEq Intravenous New Bag 5/21/25 1425)   sodium chloride 0.9% bolus 1,000 mL 1,000 mL (0 mLs Intravenous Stopped 5/21/25 1414)   ondansetron injection 4 mg (4 mg Intravenous Given 5/21/25 1314)   famotidine (PF) injection 20 mg (20 mg Intravenous Given 5/21/25 1321)   prochlorperazine injection Soln 5 mg (5 mg Intravenous Given 5/21/25 1321)   LORazepam injection 0.5 mg (0.5 mg Intravenous Given 5/21/25 1321)   aluminum-magnesium hydroxide-simethicone 200-200-20 mg/5 mL suspension 30 mL (30 mLs Oral Given 5/21/25 1334)     And   LIDOcaine viscous HCl 2% oral solution 15 mL (15 mLs Oral Given 5/21/25 1334)   potassium chloride SA CR tablet 40 mEq (40 mEq Oral Given 5/21/25 1425)                                                       [1]   Current  Facility-Administered Medications:     potassium chloride 10 mEq in 100 mL IVPB, 10 mEq, Intravenous, Q1H, Parth Hurd MD, Last Rate: 100 mL/hr at 05/21/25 1425, 10 mEq at 05/21/25 1425    Current Outpatient Medications:     esomeprazole (NEXIUM) 40 MG capsule, Take 1 capsule (40 mg total) by mouth once daily., Disp: 30 capsule, Rfl: 0    metoclopramide HCl (REGLAN) 10 MG tablet, Take 1 tablet (10 mg total) by mouth every 6 (six) hours., Disp: 30 tablet, Rfl: 0    ondansetron (ZOFRAN-ODT) 8 MG TbDL, Take 1 tablet (8 mg total) by mouth every 6 (six) hours as needed (nausea/vomiting)., Disp: 12 tablet, Rfl: 1    promethazine (PHENERGAN) 25 MG suppository, Place 1 suppository (25 mg total) rectally every 6 (six) hours as needed for Nausea., Disp: 10 suppository, Rfl: 0    promethazine (PHENERGAN) 25 MG suppository, Place 1 suppository (25 mg total) rectally every 6 (six) hours as needed for Nausea., Disp: 15 suppository, Rfl: 0    traMADoL (ULTRAM) 50 mg tablet, Take 1 tablet (50 mg total) by mouth every 4 (four) hours as needed for Pain., Disp: 10 tablet, Rfl: 0       Parth Hurd MD  05/21/25 0019

## 2025-05-22 VITALS
OXYGEN SATURATION: 98 % | RESPIRATION RATE: 18 BRPM | HEIGHT: 64 IN | TEMPERATURE: 98 F | WEIGHT: 144.63 LBS | BODY MASS INDEX: 24.69 KG/M2 | DIASTOLIC BLOOD PRESSURE: 55 MMHG | SYSTOLIC BLOOD PRESSURE: 91 MMHG | HEART RATE: 97 BPM

## 2025-05-22 PROBLEM — F41.9 ANXIETY: Status: ACTIVE | Noted: 2025-05-22

## 2025-05-22 PROBLEM — I10 PRIMARY HYPERTENSION: Status: ACTIVE | Noted: 2025-05-22

## 2025-05-22 LAB
ALBUMIN SERPL-MCNC: 4.3 G/DL (ref 3.4–5)
ALBUMIN/GLOB SERPL: 1.7 RATIO
ALP SERPL-CCNC: 55 UNIT/L (ref 50–144)
ALT SERPL-CCNC: 22 UNIT/L (ref 1–45)
ANION GAP SERPL CALC-SCNC: 5 MEQ/L (ref 2–13)
AST SERPL-CCNC: 26 UNIT/L (ref 14–36)
BASOPHILS # BLD AUTO: 0.03 X10(3)/MCL (ref 0.01–0.08)
BASOPHILS NFR BLD AUTO: 0.3 % (ref 0.1–1.2)
BILIRUB SERPL-MCNC: 0.9 MG/DL (ref 0–1)
BUN SERPL-MCNC: 16 MG/DL (ref 7–20)
CALCIUM SERPL-MCNC: 9.3 MG/DL (ref 8.4–10.2)
CHLORIDE SERPL-SCNC: 105 MMOL/L (ref 98–110)
CO2 SERPL-SCNC: 23 MMOL/L (ref 21–32)
CREAT SERPL-MCNC: 0.78 MG/DL (ref 0.66–1.25)
CREAT/UREA NIT SERPL: 21 (ref 12–20)
EOSINOPHIL # BLD AUTO: 0 X10(3)/MCL (ref 0.04–0.36)
EOSINOPHIL NFR BLD AUTO: 0 % (ref 0.7–7)
ERYTHROCYTE [DISTWIDTH] IN BLOOD BY AUTOMATED COUNT: 13 % (ref 11–14.5)
GFR SERPLBLD CREATININE-BSD FMLA CKD-EPI: >90 ML/MIN/1.73/M2
GLOBULIN SER-MCNC: 2.6 GM/DL (ref 2–3.9)
GLUCOSE SERPL-MCNC: 109 MG/DL (ref 70–115)
HCT VFR BLD AUTO: 35.4 % (ref 36–48)
HGB BLD-MCNC: 12.6 G/DL (ref 11.8–16)
IMM GRANULOCYTES # BLD AUTO: 0.1 X10(3)/MCL (ref 0–0.03)
IMM GRANULOCYTES NFR BLD AUTO: 0.9 % (ref 0–0.5)
LYMPHOCYTES # BLD AUTO: 1.24 X10(3)/MCL (ref 1.16–3.74)
LYMPHOCYTES NFR BLD AUTO: 10.8 % (ref 20–55)
MCH RBC QN AUTO: 31 PG (ref 27–34)
MCHC RBC AUTO-ENTMCNC: 35.6 G/DL (ref 31–37)
MCV RBC AUTO: 87 FL (ref 79–99)
MONOCYTES # BLD AUTO: 0.91 X10(3)/MCL (ref 0.24–0.36)
MONOCYTES NFR BLD AUTO: 8 % (ref 4.7–12.5)
NEUTROPHILS # BLD AUTO: 9.15 X10(3)/MCL (ref 1.56–6.13)
NEUTROPHILS NFR BLD AUTO: 80 % (ref 37–73)
NRBC BLD AUTO-RTO: 0 %
PLATELET # BLD AUTO: 334 X10(3)/MCL (ref 140–371)
PMV BLD AUTO: 8.1 FL (ref 9.4–12.4)
POTASSIUM SERPL-SCNC: 3.3 MMOL/L (ref 3.5–5.1)
PROT SERPL-MCNC: 6.9 GM/DL (ref 6.3–8.2)
RBC # BLD AUTO: 4.07 X10(6)/MCL (ref 4–5.1)
SODIUM SERPL-SCNC: 133 MMOL/L (ref 136–145)
WBC # BLD AUTO: 11.43 X10(3)/MCL (ref 4–11.5)

## 2025-05-22 PROCEDURE — 94761 N-INVAS EAR/PLS OXIMETRY MLT: CPT

## 2025-05-22 PROCEDURE — 85025 COMPLETE CBC W/AUTO DIFF WBC: CPT | Performed by: EMERGENCY MEDICINE

## 2025-05-22 PROCEDURE — 80053 COMPREHEN METABOLIC PANEL: CPT | Performed by: EMERGENCY MEDICINE

## 2025-05-22 PROCEDURE — 63600175 PHARM REV CODE 636 W HCPCS: Performed by: EMERGENCY MEDICINE

## 2025-05-22 PROCEDURE — 25000003 PHARM REV CODE 250: Performed by: FAMILY MEDICINE

## 2025-05-22 PROCEDURE — G0378 HOSPITAL OBSERVATION PER HR: HCPCS

## 2025-05-22 PROCEDURE — 36415 COLL VENOUS BLD VENIPUNCTURE: CPT | Performed by: EMERGENCY MEDICINE

## 2025-05-22 PROCEDURE — 25000003 PHARM REV CODE 250: Performed by: EMERGENCY MEDICINE

## 2025-05-22 RX ORDER — ESOMEPRAZOLE MAGNESIUM 40 MG/1
40 CAPSULE, DELAYED RELEASE ORAL DAILY
Qty: 30 CAPSULE | Refills: 0 | Status: SHIPPED | OUTPATIENT
Start: 2025-05-22 | End: 2026-05-22

## 2025-05-22 RX ORDER — CLONIDINE HYDROCHLORIDE 0.1 MG/1
0.2 TABLET ORAL EVERY 6 HOURS PRN
Status: DISCONTINUED | OUTPATIENT
Start: 2025-05-22 | End: 2025-05-22 | Stop reason: HOSPADM

## 2025-05-22 RX ORDER — DULOXETIN HYDROCHLORIDE 30 MG/1
60 CAPSULE, DELAYED RELEASE ORAL DAILY
Status: DISCONTINUED | OUTPATIENT
Start: 2025-05-22 | End: 2025-05-22 | Stop reason: HOSPADM

## 2025-05-22 RX ORDER — AMLODIPINE BESYLATE 5 MG/1
10 TABLET ORAL DAILY
Status: DISCONTINUED | OUTPATIENT
Start: 2025-05-22 | End: 2025-05-22 | Stop reason: HOSPADM

## 2025-05-22 RX ORDER — HYDROCODONE BITARTRATE AND ACETAMINOPHEN 5; 325 MG/1; MG/1
1 TABLET ORAL EVERY 6 HOURS PRN
Refills: 0 | Status: DISCONTINUED | OUTPATIENT
Start: 2025-05-22 | End: 2025-05-22 | Stop reason: HOSPADM

## 2025-05-22 RX ORDER — CLONAZEPAM 0.5 MG/1
1 TABLET ORAL 2 TIMES DAILY PRN
Status: DISCONTINUED | OUTPATIENT
Start: 2025-05-22 | End: 2025-05-22 | Stop reason: HOSPADM

## 2025-05-22 RX ORDER — POTASSIUM CHLORIDE 20 MEQ/1
40 TABLET, EXTENDED RELEASE ORAL DAILY
Qty: 60 TABLET | Refills: 1 | Status: SHIPPED | OUTPATIENT
Start: 2025-05-22

## 2025-05-22 RX ORDER — LOSARTAN POTASSIUM 50 MG/1
100 TABLET ORAL DAILY
Status: DISCONTINUED | OUTPATIENT
Start: 2025-05-22 | End: 2025-05-22 | Stop reason: HOSPADM

## 2025-05-22 RX ORDER — HYDRALAZINE HYDROCHLORIDE 20 MG/ML
10 INJECTION INTRAMUSCULAR; INTRAVENOUS EVERY 4 HOURS PRN
Status: DISCONTINUED | OUTPATIENT
Start: 2025-05-22 | End: 2025-05-22 | Stop reason: HOSPADM

## 2025-05-22 RX ADMIN — PANTOPRAZOLE SODIUM 40 MG: 40 TABLET, DELAYED RELEASE ORAL at 08:05

## 2025-05-22 RX ADMIN — POTASSIUM CHLORIDE 40 MEQ: 1500 TABLET, EXTENDED RELEASE ORAL at 08:05

## 2025-05-22 RX ADMIN — HYDROCODONE BITARTRATE AND ACETAMINOPHEN 1 TABLET: 5; 325 TABLET ORAL at 09:05

## 2025-05-22 RX ADMIN — ONDANSETRON 4 MG: 2 INJECTION INTRAMUSCULAR; INTRAVENOUS at 08:05

## 2025-05-22 RX ADMIN — POTASSIUM CHLORIDE 40 MEQ: 1500 TABLET, EXTENDED RELEASE ORAL at 02:05

## 2025-05-22 RX ADMIN — DULOXETINE HYDROCHLORIDE 60 MG: 30 CAPSULE, DELAYED RELEASE ORAL at 09:05

## 2025-05-22 RX ADMIN — CLONAZEPAM 1 MG: 0.5 TABLET ORAL at 11:05

## 2025-05-22 RX ADMIN — AMLODIPINE BESYLATE 10 MG: 5 TABLET ORAL at 09:05

## 2025-05-22 RX ADMIN — ACETAMINOPHEN 650 MG: 325 TABLET, FILM COATED ORAL at 03:05

## 2025-05-22 RX ADMIN — LOSARTAN POTASSIUM 100 MG: 50 TABLET, FILM COATED ORAL at 09:05

## 2025-05-22 NOTE — SUBJECTIVE & OBJECTIVE
Past Medical History:   Diagnosis Date    Anxiety disorder, unspecified     Depression     Fibromyalgia syndrome     Gastro-esophageal reflux disease without esophagitis     Hypertension        Past Surgical History:   Procedure Laterality Date    knee sx      SINUS SURGERY         Review of patient's allergies indicates:  No Known Allergies    No current facility-administered medications on file prior to encounter.     Current Outpatient Medications on File Prior to Encounter   Medication Sig    amLODIPine (NORVASC) 10 MG tablet Take 10 mg by mouth once daily.    DULoxetine (CYMBALTA) 60 MG capsule Take 60 mg by mouth once daily.    KLONOPIN 1 mg tablet Take 1 mg by mouth 2 (two) times daily as needed.    dextroamphetamine-amphetamine (ADDERALL XR) 30 MG 24 hr capsule Take 30 mg by mouth.    furosemide (LASIX) 40 MG tablet Take 40 mg by mouth.    hydroCHLOROthiazide (HYDRODIURIL) 25 MG tablet Take 25 mg by mouth.    losartan (COZAAR) 100 MG tablet Take 100 mg by mouth once daily.    ondansetron (ZOFRAN) 4 MG tablet Take 4 mg by mouth.    promethazine (PHENERGAN) 25 MG tablet Take 25 mg by mouth.    traZODone (DESYREL) 150 MG tablet Take 300 mg by mouth every evening.     Family History    None       Tobacco Use    Smoking status: Never    Smokeless tobacco: Never   Substance and Sexual Activity    Alcohol use: Not Currently    Drug use: Not Currently    Sexual activity: Not on file     Review of Systems   Constitutional:  Negative for activity change, appetite change and fever.   Respiratory:  Negative for chest tightness, shortness of breath and wheezing.    Cardiovascular:  Negative for chest pain.   Gastrointestinal:  Positive for abdominal pain (resoirt weird feeling in stomach not necessarily pain), nausea and vomiting. Negative for constipation and diarrhea.   Genitourinary:  Negative for dysuria.   Skin:  Negative for rash and wound.   Neurological:  Negative for tremors and headaches.     Objective:      Vital Signs (Most Recent):  Temp: 99.7 °F (37.6 °C) (05/22/25 0745)  Pulse: 101 (05/22/25 0745)  Resp: 20 (05/22/25 0745)  BP: (!) 154/83 (05/22/25 0745)  SpO2: 98 % (05/22/25 0815) Vital Signs (24h Range):  Temp:  [97.8 °F (36.6 °C)-99.7 °F (37.6 °C)] 99.7 °F (37.6 °C)  Pulse:  [] 101  Resp:  [16-20] 20  SpO2:  [95 %-100 %] 98 %  BP: (111-164)/(78-93) 154/83     Weight: 65.6 kg (144 lb 9.6 oz)  Body mass index is 24.82 kg/m².     Physical Exam  Vitals and nursing note reviewed. Exam conducted with a chaperone present.   Constitutional:       General: She is in acute distress.      Appearance: Normal appearance. She is normal weight. She is ill-appearing. She is not toxic-appearing.   Cardiovascular:      Rate and Rhythm: Normal rate and regular rhythm.      Pulses: Normal pulses.      Heart sounds: Normal heart sounds.   Pulmonary:      Effort: Pulmonary effort is normal.      Breath sounds: Normal breath sounds.   Abdominal:      General: Abdomen is flat. Bowel sounds are normal.      Palpations: Abdomen is soft.      Tenderness: There is abdominal tenderness (mild epigastric).   Musculoskeletal:      Right lower leg: No edema.      Left lower leg: No edema.   Skin:     General: Skin is warm and dry.      Capillary Refill: Capillary refill takes less than 2 seconds.      Findings: No erythema, lesion or rash.   Neurological:      General: No focal deficit present.      Mental Status: She is alert and oriented to person, place, and time.   Psychiatric:         Mood and Affect: Mood normal.         Behavior: Behavior normal.         Thought Content: Thought content normal.      Comments: Anxious and tearful                Significant Labs: All pertinent labs within the past 24 hours have been reviewed.  BMP:   Recent Labs   Lab 05/21/25  1332 05/22/25  0546   * 109   * 133*   K 2.4* 3.3*   CL 98 105   CO2 25 23   BUN 26* 16   CREATININE 1.17 0.78   CALCIUM 9.8 9.3   MG 2.00  --      CBC:    Recent Labs   Lab 05/21/25  1315 05/22/25  0546   WBC 14.27* 11.43   HGB 15.6 12.6   HCT 43.7 35.4*   * 334       Significant Imaging: I have reviewed all pertinent imaging results/findings within the past 24 hours.

## 2025-05-22 NOTE — PLAN OF CARE
Problem: Adult Inpatient Plan of Care  Goal: Plan of Care Review  Outcome: Progressing  Goal: Patient-Specific Goal (Individualized)  Outcome: Progressing  Goal: Absence of Hospital-Acquired Illness or Injury  Outcome: Progressing  Goal: Optimal Comfort and Wellbeing  Outcome: Progressing  Intervention: Provide Person-Centered Care  Flowsheets (Taken 5/22/2025 6451)  Trust Relationship/Rapport:   care explained   questions encouraged   questions answered  Goal: Readiness for Transition of Care  Outcome: Progressing     Problem: Acute Kidney Injury/Impairment  Goal: Fluid and Electrolyte Balance  Outcome: Progressing  Goal: Improved Oral Intake  Outcome: Progressing  Goal: Effective Renal Function  Outcome: Progressing     Problem: Nausea and Vomiting  Goal: Nausea and Vomiting Relief  Outcome: Progressing     Problem: Electrolyte Imbalance  Goal: Electrolyte Balance  Outcome: Progressing

## 2025-05-22 NOTE — ASSESSMENT & PLAN NOTE
Patient's most recent potassium results are listed below.   Recent Labs     05/21/25  1332 05/22/25  0546   K 2.4* 3.3*     Plan  - Replete potassium per protocol  - Monitor potassium Daily  - Patient's hypokalemia is worsening. Will admit to obs for iv and po k  -  improved, continue po replacement

## 2025-05-22 NOTE — PLAN OF CARE
05/22/25 1245   Discharge Assessment   Assessment Type Discharge Planning Assessment   Confirmed/corrected address, phone number and insurance Yes   Confirmed Demographics Correct on Facesheet   Source of Information patient   Communicated JULY with patient/caregiver Yes   People in Home child(dmitri), adult   Name(s) of People in Home Carmelo   Facility Arrived From: Home   Do you expect to return to your current living situation? Yes   Prior to hospitilization cognitive status: Alert/Oriented   Current cognitive status: Alert/Oriented   Walking or Climbing Stairs Difficulty no   Dressing/Bathing Difficulty no   Equipment Currently Used at Home none   Readmission within 30 days? No   Patient currently being followed by outpatient case management? No   Do you currently have service(s) that help you manage your care at home? No   Do you take prescription medications? Yes   Do you have prescription coverage? Yes   Do you have any problems affording any of your prescribed medications? No   Is the patient taking medications as prescribed? yes   Who is going to help you get home at discharge? Mother   How do you get to doctors appointments? car, drives self;family or friend will provide   Are you on dialysis? No   Do you take coumadin? No   Discharge Plan A Home   DME Needed Upon Discharge  none   Discharge Plan discussed with: Patient   Transition of Care Barriers None   Physical Activity   On average, how many days per week do you engage in moderate to strenuous exercise (like a brisk walk)? 0 days   On average, how many minutes do you engage in exercise at this level? 0 min   Social Isolation   How often do you feel lonely or isolated from those around you?  Never   Alcohol Use   Q1: How often do you have a drink containing alcohol? Never   Q2: How many drinks containing alcohol do you have on a typical day when you are drinking? None   Q3: How often do you have six or more drinks on one occasion? Never

## 2025-05-22 NOTE — HOSPITAL COURSE
05/22/2025 pt tearful c/o HA, has some home BP meds that were not identified at admit, as well as her cymbalta and chiquionpin from home, will address, still very nauseated, did tolerate a little liquids, but was dry heaving all eveing and through the night.  Not able to take enough po to meet her hydration and nutritional needs.  05/22/2025 DISCHARGE SUMMARY: pt tolerated regular diet, nausea and vomtiing have resolved and she is requesting d/c home.

## 2025-05-22 NOTE — ASSESSMENT & PLAN NOTE
Patient's blood pressure range in the last 24 hours was: BP  Min: 111/88  Max: 164/92.The patient's inpatient anti-hypertensive regimen is listed below:  Current Antihypertensives  , Daily, Oral  , Daily, Oral  , , Oral  , , Oral  amLODIPine tablet 10 mg, Daily, Oral  losartan tablet 100 mg, Daily, Oral  cloNIDine tablet 0.2 mg, Every 6 hours PRN, Oral  hydrALAZINE injection 10 mg, Every 4 hours PRN, Intravenous    Plan  - BP is uncontrolled, will adjust as follows: resume home meds  -

## 2025-05-22 NOTE — PLAN OF CARE
Problem: Adult Inpatient Plan of Care  Goal: Plan of Care Review  Outcome: Met  Goal: Patient-Specific Goal (Individualized)  Outcome: Met  Goal: Absence of Hospital-Acquired Illness or Injury  Outcome: Met  Goal: Optimal Comfort and Wellbeing  Outcome: Met  Goal: Readiness for Transition of Care  Outcome: Met     Problem: Acute Kidney Injury/Impairment  Goal: Fluid and Electrolyte Balance  Outcome: Met  Goal: Improved Oral Intake  Outcome: Met  Goal: Effective Renal Function  Outcome: Met     Problem: Nausea and Vomiting  Goal: Nausea and Vomiting Relief  Outcome: Met     Problem: Electrolyte Imbalance  Goal: Electrolyte Balance  Outcome: Met

## 2025-05-22 NOTE — PROGRESS NOTES
[facility] Medicine  Progress Note    Patient Name: Christal Dixon  MRN: [id number]  Patient Class: OP- Observation   Admission Date: 5/21/2025  Length of Stay: 0 days  Attending Physician: Sheeba Galindo MD  Primary Care Provider: Sebastián Munoz MD        Subjective     Principal Problem:<principal problem not specified>        HPI:  This is a 49-year-old  woman past medical history significant for hypertension, GERD, fibromyalgia, anxiety/depression, cyclic vomiting episodes who presented to the emergency department [facility] today by personal vehicle with a chief complaint of about 24 hours of nausea and vomiting and inability to tolerate p.o. similar to previous cyclical vomiting episodes. She reports that she has been under a lot of emotional stress recently including the high school graduation of her son. She reports that the nausea symptoms started yesterday evening 05/20/2025. She has medicinal marijuana prescription for edibles and she reports she took 1 after the symptoms started to try to help with the nausea, however there was no improvement. She reports this often helps. It did not this time. She has had multiple episodes of vomiting of stomach contents and reports now that she is mostly dry heaving and occasionally having some bile. She has some burning sensation/water brash in her chest that she reports is from vomiting. Pt reports prior work up including scopes has not really found cause for recurrent nausea and vomiting.  No shortness of breath. No lower extremity edema. No fevers or chills or other systemic symptoms. No dysuria or hematuria or flank or CVA pain. Reports no history of kidney stones. No abdominal surgeries. Denies any alcohol use. She reports that last time she had an episode like this she waited for a few days before coming in and ultimately had to be admitted to the hospital overnight. Chart review shows this was in February of 2025 and she  had mild ZEESHAN with a creatinine of 1.37 at that time and had hypokalemia of 2.4. She was given IV fluids, IV prochlorperazine, potassium repletion, GI cocktail, p.o. clonazepam and looks like she was discharged after a short observation stay. She reports that these treatments work fairly well last time for her.   Discussed with WELL[place]D agree with placing in observation, will replace K both po and iv, no fuerther vomiting since here, she received 1L IVF in the ER, will bolus another 1 L and give NS at 100cc/hr.      Overview/Hospital Course:  05/22/2025 pt tearful c/o STILL, has some home BP meds that were not identified at admit, as well as her cymbalta and klonpin from home, will address, still very nauseated, did tolerate a little liquids, but was dry heaving all eveing and through the night.  Not able to take enough po to meet her hydration and nutritional needs.    Past Medical History:   Diagnosis Date    Anxiety disorder, unspecified     Depression     Fibromyalgia syndrome     Gastro-esophageal reflux disease without esophagitis     Hypertension        Past Surgical History:   Procedure Laterality Date    knee sx      SINUS SURGERY         Review of patient's allergies indicates:  No Known Allergies    No current facility-administered medications on file prior to encounter.     Current Outpatient Medications on File Prior to Encounter   Medication Sig    amLODIPine (NORVASC) 10 MG tablet Take 10 mg by mouth once daily.    DULoxetine (CYMBALTA) 60 MG capsule Take 60 mg by mouth once daily.    KLONOPIN 1 mg tablet Take 1 mg by mouth 2 (two) times daily as needed.    dextroamphetamine-amphetamine (ADDERALL XR) 30 MG 24 hr capsule Take 30 mg by mouth.    furosemide (LASIX) 40 MG tablet Take 40 mg by mouth.    hydroCHLOROthiazide (HYDRODIURIL) 25 MG tablet Take 25 mg by mouth.    losartan (COZAAR) 100 MG tablet Take 100 mg by mouth once daily.    ondansetron (ZOFRAN) 4 MG tablet Take 4 mg by mouth.    promethazine  (PHENERGAN) 25 MG tablet Take 25 mg by mouth.    traZODone (DESYREL) 150 MG tablet Take 300 mg by mouth every evening.     Family History    None       Tobacco Use    Smoking status: Never    Smokeless tobacco: Never   Substance and Sexual Activity    Alcohol use: Not Currently    Drug use: Not Currently    Sexual activity: Not on file     Review of Systems   Constitutional:  Negative for activity change, appetite change and fever.   Respiratory:  Negative for chest tightness, shortness of breath and wheezing.    Cardiovascular:  Negative for chest pain.   Gastrointestinal:  Positive for abdominal pain (resoirt weird feeling in stomach not necessarily pain), nausea and vomiting. Negative for constipation and diarrhea.   Genitourinary:  Negative for dysuria.   Skin:  Negative for rash and wound.   Neurological:  Negative for tremors and headaches.     Objective:     Vital Signs (Most Recent):  Temp: 99.7 °F (37.6 °C) (05/22/25 0745)  Pulse: 101 (05/22/25 0745)  Resp: 20 (05/22/25 0745)  BP: (!) 154/83 (05/22/25 0745)  SpO2: 98 % (05/22/25 0815) Vital Signs (24h Range):  Temp:  [97.8 °F (36.6 °C)-99.7 °F (37.6 °C)] 99.7 °F (37.6 °C)  Pulse:  [] 101  Resp:  [16-20] 20  SpO2:  [95 %-100 %] 98 %  BP: (111-164)/(78-93) 154/83     Weight: 65.6 kg (144 lb 9.6 oz)  Body mass index is 24.82 kg/m².     Physical Exam  Vitals and nursing note reviewed. Exam conducted with a chaperone present.   Constitutional:       General: She is in acute distress.      Appearance: Normal appearance. She is normal weight. She is ill-appearing. She is not toxic-appearing.   Cardiovascular:      Rate and Rhythm: Normal rate and regular rhythm.      Pulses: Normal pulses.      Heart sounds: Normal heart sounds.   Pulmonary:      Effort: Pulmonary effort is normal.      Breath sounds: Normal breath sounds.   Abdominal:      General: Abdomen is flat. Bowel sounds are normal.      Palpations: Abdomen is soft.      Tenderness: There is  abdominal tenderness (mild epigastric).   Musculoskeletal:      Right lower leg: No edema.      Left lower leg: No edema.   Skin:     General: Skin is warm and dry.      Capillary Refill: Capillary refill takes less than 2 seconds.      Findings: No erythema, lesion or rash.   Neurological:      General: No focal deficit present.      Mental Status: She is alert and oriented to person, place, and time.   Psychiatric:         Mood and Affect: Mood normal.         Behavior: Behavior normal.         Thought Content: Thought content normal.      Comments: Anxious and tearful                Significant Labs: All pertinent labs within the past 24 hours have been reviewed.  BMP:   Recent Labs   Lab 05/21/25  1332 05/22/25  0546   * 109   * 133*   K 2.4* 3.3*   CL 98 105   CO2 25 23   BUN 26* 16   CREATININE 1.17 0.78   CALCIUM 9.8 9.3   MG 2.00  --      CBC:   Recent Labs   Lab 05/21/25  1315 05/22/25  0546   WBC 14.27* 11.43   HGB 15.6 12.6   HCT 43.7 35.4*   * 334       Significant Imaging: I have reviewed all pertinent imaging results/findings within the past 24 hours.      Assessment & Plan  Hypokalemia  Patient's most recent potassium results are listed below.   Recent Labs     05/21/25  1332 05/22/25  0546   K 2.4* 3.3*     Plan  - Replete potassium per protocol  - Monitor potassium Daily  - Patient's hypokalemia is worsening. Will admit to obs for iv and po k  -  improved, continue po replacement  Intractable nausea and vomiting  Admit for ivf  Cotninue ivf, not meeting hydration and nutritional needs      Primary hypertension  Patient's blood pressure range in the last 24 hours was: BP  Min: 111/88  Max: 164/92.The patient's inpatient anti-hypertensive regimen is listed below:  Current Antihypertensives  , Daily, Oral  , Daily, Oral  , , Oral  , , Oral  amLODIPine tablet 10 mg, Daily, Oral  losartan tablet 100 mg, Daily, Oral  cloNIDine tablet 0.2 mg, Every 6 hours PRN, Oral  hydrALAZINE  injection 10 mg, Every 4 hours PRN, Intravenous    Plan  - BP is uncontrolled, will adjust as follows: resume home meds  -    Anxiety  Home klonopin prn    VTE Risk Mitigation (From admission, onward)           Ordered     IP VTE LOW RISK PATIENT  Once         05/21/25 1510                    Discharge Planning   JULY:      Code Status: Full Code   Medical Readiness for Discharge Date:                            Sheeba Galindo MD  Department of Hospital Medicine   [facility]/Surg

## 2025-05-23 ENCOUNTER — HOSPITAL ENCOUNTER (EMERGENCY)
Facility: HOSPITAL | Age: 50
Discharge: HOME | End: 2025-05-23
Payer: COMMERCIAL

## 2025-05-23 VITALS
RESPIRATION RATE: 18 BRPM | BODY MASS INDEX: 23.99 KG/M2 | TEMPERATURE: 98 F | WEIGHT: 144 LBS | HEART RATE: 105 BPM | HEIGHT: 65 IN | DIASTOLIC BLOOD PRESSURE: 92 MMHG | OXYGEN SATURATION: 98 % | SYSTOLIC BLOOD PRESSURE: 139 MMHG

## 2025-05-23 DIAGNOSIS — R11.2 NAUSEA AND VOMITING, UNSPECIFIED VOMITING TYPE: Primary | ICD-10-CM

## 2025-05-23 LAB
ALBUMIN SERPL-MCNC: 5.1 G/DL (ref 3.4–5)
ALBUMIN/GLOB SERPL: 1.8 RATIO
ALP SERPL-CCNC: 75 UNIT/L (ref 50–144)
ALT SERPL-CCNC: 33 UNIT/L (ref 1–45)
AMPHET UR QL SCN: NEGATIVE
ANION GAP SERPL CALC-SCNC: 8 MEQ/L (ref 2–13)
AST SERPL-CCNC: 35 UNIT/L (ref 14–36)
BARBITURATE SCN PRESENT UR: NEGATIVE
BASOPHILS # BLD AUTO: 0.1 X10(3)/MCL (ref 0.01–0.08)
BASOPHILS NFR BLD AUTO: 0.6 % (ref 0.1–1.2)
BENZODIAZ UR QL SCN: NEGATIVE
BILIRUB SERPL-MCNC: 1.5 MG/DL (ref 0–1)
BILIRUB UR QL STRIP.AUTO: NEGATIVE
BUN SERPL-MCNC: 17 MG/DL (ref 7–20)
CALCIUM SERPL-MCNC: 9.5 MG/DL (ref 8.4–10.2)
CANNABINOIDS UR QL SCN: POSITIVE
CHLORIDE SERPL-SCNC: 100 MMOL/L (ref 98–110)
CLARITY UR: CLEAR
CO2 SERPL-SCNC: 26 MMOL/L (ref 21–32)
COCAINE UR QL SCN: NEGATIVE
COLOR UR AUTO: YELLOW
CREAT SERPL-MCNC: 0.81 MG/DL (ref 0.66–1.25)
CREAT/UREA NIT SERPL: 21 (ref 12–20)
EOSINOPHIL # BLD AUTO: 0.09 X10(3)/MCL (ref 0.04–0.36)
EOSINOPHIL NFR BLD AUTO: 0.5 % (ref 0.7–7)
ERYTHROCYTE [DISTWIDTH] IN BLOOD BY AUTOMATED COUNT: 12.9 % (ref 11–14.5)
GFR SERPLBLD CREATININE-BSD FMLA CKD-EPI: 90 ML/MIN/1.73/M2
GLOBULIN SER-MCNC: 2.9 GM/DL (ref 2–3.9)
GLUCOSE SERPL-MCNC: 106 MG/DL (ref 70–115)
GLUCOSE UR QL STRIP: NEGATIVE
HCT VFR BLD AUTO: 41.3 % (ref 36–48)
HGB BLD-MCNC: 14.6 G/DL (ref 11.8–16)
HGB UR QL STRIP: NEGATIVE
IMM GRANULOCYTES # BLD AUTO: 0.07 X10(3)/MCL (ref 0–0.03)
IMM GRANULOCYTES NFR BLD AUTO: 0.4 % (ref 0–0.5)
KETONES UR QL STRIP: NEGATIVE
LEUKOCYTE ESTERASE UR QL STRIP: NEGATIVE
LIPASE SERPL-CCNC: 228 U/L (ref 23–300)
LYMPHOCYTES # BLD AUTO: 3.34 X10(3)/MCL (ref 1.16–3.74)
LYMPHOCYTES NFR BLD AUTO: 20.4 % (ref 20–55)
MAGNESIUM SERPL-MCNC: 1.8 MG/DL (ref 1.8–2.4)
MCH RBC QN AUTO: 30.9 PG (ref 27–34)
MCHC RBC AUTO-ENTMCNC: 35.4 G/DL (ref 31–37)
MCV RBC AUTO: 87.3 FL (ref 79–99)
METHADONE UR QL SCN: NEGATIVE
MONOCYTES # BLD AUTO: 1.6 X10(3)/MCL (ref 0.24–0.36)
MONOCYTES NFR BLD AUTO: 9.8 % (ref 4.7–12.5)
NEUTROPHILS # BLD AUTO: 11.2 X10(3)/MCL (ref 1.56–6.13)
NEUTROPHILS NFR BLD AUTO: 68.3 % (ref 37–73)
NITRITE UR QL STRIP: NEGATIVE
NRBC BLD AUTO-RTO: 0 %
OPIATES UR QL SCN: NEGATIVE
PCP UR QL: NEGATIVE
PH UR STRIP: 6 [PH]
PH UR: 6 [PH] (ref 5–8)
PLATELET # BLD AUTO: 373 X10(3)/MCL (ref 140–371)
PMV BLD AUTO: 8 FL (ref 9.4–12.4)
POTASSIUM SERPL-SCNC: 2.8 MMOL/L (ref 3.5–5.1)
PROT SERPL-MCNC: 8 GM/DL (ref 6.3–8.2)
PROT UR QL STRIP: NEGATIVE
RBC # BLD AUTO: 4.73 X10(6)/MCL (ref 4–5.1)
SODIUM SERPL-SCNC: 134 MMOL/L (ref 136–145)
SP GR UR STRIP.AUTO: 1.01 (ref 1–1.03)
UROBILINOGEN UR STRIP-ACNC: 0.2
WBC # BLD AUTO: 16.4 X10(3)/MCL (ref 4–11.5)

## 2025-05-23 PROCEDURE — 96375 TX/PRO/DX INJ NEW DRUG ADDON: CPT

## 2025-05-23 PROCEDURE — 83735 ASSAY OF MAGNESIUM: CPT | Performed by: NURSE PRACTITIONER

## 2025-05-23 PROCEDURE — 83690 ASSAY OF LIPASE: CPT | Performed by: NURSE PRACTITIONER

## 2025-05-23 PROCEDURE — 99284 EMERGENCY DEPT VISIT MOD MDM: CPT | Mod: 25

## 2025-05-23 PROCEDURE — 25000003 PHARM REV CODE 250: Performed by: NURSE PRACTITIONER

## 2025-05-23 PROCEDURE — 85025 COMPLETE CBC W/AUTO DIFF WBC: CPT | Performed by: NURSE PRACTITIONER

## 2025-05-23 PROCEDURE — 96365 THER/PROPH/DIAG IV INF INIT: CPT

## 2025-05-23 PROCEDURE — 63600175 PHARM REV CODE 636 W HCPCS: Performed by: NURSE PRACTITIONER

## 2025-05-23 PROCEDURE — 80053 COMPREHEN METABOLIC PANEL: CPT | Performed by: NURSE PRACTITIONER

## 2025-05-23 PROCEDURE — 81003 URINALYSIS AUTO W/O SCOPE: CPT | Performed by: NURSE PRACTITIONER

## 2025-05-23 PROCEDURE — 80307 DRUG TEST PRSMV CHEM ANLYZR: CPT | Performed by: NURSE PRACTITIONER

## 2025-05-23 PROCEDURE — 96361 HYDRATE IV INFUSION ADD-ON: CPT

## 2025-05-23 RX ORDER — LIDOCAINE HYDROCHLORIDE 20 MG/ML
15 SOLUTION OROPHARYNGEAL ONCE
Status: COMPLETED | OUTPATIENT
Start: 2025-05-23 | End: 2025-05-23

## 2025-05-23 RX ORDER — PROCHLORPERAZINE EDISYLATE 5 MG/ML
10 INJECTION INTRAMUSCULAR; INTRAVENOUS
Status: COMPLETED | OUTPATIENT
Start: 2025-05-23 | End: 2025-05-23

## 2025-05-23 RX ORDER — POTASSIUM CHLORIDE 7.45 MG/ML
10 INJECTION INTRAVENOUS
Status: COMPLETED | OUTPATIENT
Start: 2025-05-23 | End: 2025-05-23

## 2025-05-23 RX ORDER — POTASSIUM CHLORIDE 20 MEQ/1
40 TABLET, EXTENDED RELEASE ORAL
Status: COMPLETED | OUTPATIENT
Start: 2025-05-23 | End: 2025-05-23

## 2025-05-23 RX ORDER — ALUMINUM HYDROXIDE, MAGNESIUM HYDROXIDE, AND SIMETHICONE 1200; 120; 1200 MG/30ML; MG/30ML; MG/30ML
30 SUSPENSION ORAL ONCE
Status: COMPLETED | OUTPATIENT
Start: 2025-05-23 | End: 2025-05-23

## 2025-05-23 RX ADMIN — POTASSIUM CHLORIDE 40 MEQ: 1500 TABLET, EXTENDED RELEASE ORAL at 02:05

## 2025-05-23 RX ADMIN — ALUMINUM HYDROXIDE, MAGNESIUM HYDROXIDE, AND DIMETHICONE 30 ML: 200; 20; 200 SUSPENSION ORAL at 01:05

## 2025-05-23 RX ADMIN — POTASSIUM CHLORIDE 10 MEQ: 7.46 INJECTION, SOLUTION INTRAVENOUS at 02:05

## 2025-05-23 RX ADMIN — PROCHLORPERAZINE EDISYLATE 10 MG: 5 INJECTION INTRAMUSCULAR; INTRAVENOUS at 01:05

## 2025-05-23 RX ADMIN — LIDOCAINE HYDROCHLORIDE 15 ML: 20 SOLUTION ORAL at 01:05

## 2025-05-23 RX ADMIN — POTASSIUM CHLORIDE 10 MEQ: 7.46 INJECTION, SOLUTION INTRAVENOUS at 03:05

## 2025-05-23 RX ADMIN — SODIUM CHLORIDE 1000 ML: 0.9 INJECTION, SOLUTION INTRAVENOUS at 01:05

## 2025-05-23 NOTE — ASSESSMENT & PLAN NOTE
Patient's blood pressure range in the last 24 hours was: BP  Min: 91/55  Max: 165/92.The patient's inpatient anti-hypertensive regimen is listed below:  Current Antihypertensives  , Daily, Oral  , Daily, Oral  , , Oral  , , Oral    Plan  - BP is uncontrolled, will adjust as follows: resume home meds  -

## 2025-05-23 NOTE — DISCHARGE SUMMARY
[facility]/Surg  [facility] Medicine  Discharge Summary      Patient Name: Christal Dixon  MRN: [id number]  [facility]: [id number]  Patient Class: OP- Observation  Admission Date: 5/21/2025  Hospital Length of Stay: 0 days  Discharge Date and Time: 5/22/2025  4:45 PM  Attending Physician: No att. providers found   Discharging Provider: Sheeba Galindo MD  Primary Care Provider: Sebastián Munoz MD    Primary Care Team: Networked reference to record PCT     HPI:   This is a 49-year-old  woman past medical history significant for hypertension, GERD, fibromyalgia, anxiety/depression, cyclic vomiting episodes who presented to the emergency department [facility] today by personal vehicle with a chief complaint of about 24 hours of nausea and vomiting and inability to tolerate p.o. similar to previous cyclical vomiting episodes. She reports that she has been under a lot of emotional stress recently including the high school graduation of her son. She reports that the nausea symptoms started yesterday evening 05/20/2025. She has medicinal marijuana prescription for edibles and she reports she took 1 after the symptoms started to try to help with the nausea, however there was no improvement. She reports this often helps. It did not this time. She has had multiple episodes of vomiting of stomach contents and reports now that she is mostly dry heaving and occasionally having some bile. She has some burning sensation/water brash in her chest that she reports is from vomiting. Pt reports prior work up including scopes has not really found cause for recurrent nausea and vomiting.  No shortness of breath. No lower extremity edema. No fevers or chills or other systemic symptoms. No dysuria or hematuria or flank or CVA pain. Reports no history of kidney stones. No abdominal surgeries. Denies any alcohol use. She reports that last time she had an episode like this she waited for a few days before coming in and ultimately  had to be admitted to the hospital overnight. Chart review shows this was in February of 2025 and she had mild ZEESHAN with a creatinine of 1.37 at that time and had hypokalemia of 2.4. She was given IV fluids, IV prochlorperazine, potassium repletion, GI cocktail, p.o. clonazepam and looks like she was discharged after a short observation stay. She reports that these treatments work fairly well last time for her.   Discussed with ERMD agree with placing in observation, will replace K both po and iv, no fuerther vomiting since here, she received 1L IVF in the ER, will bolus another 1 L and give NS at 100cc/hr.      * No surgery found *      Hospital Course:   05/22/2025 pt tearful c/o STILL, has some home BP meds that were not identified at admit, as well as her cymbalta and klonpin from home, will address, still very nauseated, did tolerate a little liquids, but was dry heaving all eveing and through the night.  Not able to take enough po to meet her hydration and nutritional needs.  05/22/2025 DISCHARGE SUMMARY: pt tolerated regular diet, nausea and vomtiing have resolved and she is requesting d/c home.     Goals of Care Treatment Preferences:  Code Status: Full Code      SDOH Screening:  The patient was screened for utility difficulties, food insecurity, transport difficulties, housing insecurity, and interpersonal safety and there were no concerns identified this admission.     Consults:     Assessment & Plan  Hypokalemia  Patient's most recent potassium results are listed below.   Recent Labs     05/21/25  1332 05/22/25  0546   K 2.4* 3.3*     Plan  - Replete potassium per protocol  - Monitor potassium Daily  - Patient's hypokalemia is worsening. Will admit to obs for iv and po k  -  improved, continue po replacement  Intractable nausea and vomiting  Admit for ivf  Cotninue ivf, not meeting hydration and nutritional needs      Primary hypertension  Patient's blood pressure range in the last 24 hours was: BP  Min:  91/55  Max: 165/92.The patient's inpatient anti-hypertensive regimen is listed below:  Current Antihypertensives  , Daily, Oral  , Daily, Oral  , , Oral  , , Oral    Plan  - BP is uncontrolled, will adjust as follows: resume home meds  -    Anxiety  Home klonopin prn    Final Active Diagnoses:    Diagnosis Date Noted POA    PRINCIPAL PROBLEM:  Hypokalemia [E87.6] 02/01/2025 Yes    Intractable nausea and vomiting [R11.2] 02/01/2025 Yes    Primary hypertension [I10] 05/22/2025 Yes    Anxiety [F41.9] 05/22/2025 Yes      Problems Resolved During this Admission:       Discharged Condition: good    Disposition: Home or Self Care    Follow Up:   Follow-up Information       Sebastián Munoz MD. Go on 5/28/2025.    Specialty: Family Medicine  Why: @ 1pm  Contact information:  [facility] [address]  [place] [address]  [phone]                           Patient Instructions:      Activity as tolerated       Significant Diagnostic Studies: Labs: CMP   Recent Labs   Lab 05/21/25  1332 05/22/25  0546   * 133*   K 2.4* 3.3*   CL 98 105   CO2 25 23   * 109   BUN 26* 16   CREATININE 1.17 0.78   CALCIUM 9.8 9.3   PROT 8.2 6.9   ALBUMIN 5.0 4.3   BILITOT 1.4* 0.9   ALKPHOS 69 55   AST 29 26   ALT 26 22    and CBC   Recent Labs   Lab 05/21/25  1315 05/22/25  0546   WBC 14.27* 11.43   HGB 15.6 12.6   HCT 43.7 35.4*   * 334       Pending Diagnostic Studies:       None           Medications:  Reconciled Home Medications:      Medication List        START taking these medications      potassium chloride SA 20 MEQ tablet  Commonly known as: K-DUR,KLOR-CON  Take 2 tablets (40 mEq total) by mouth once daily.            CONTINUE taking these medications      amLODIPine 10 MG tablet  Commonly known as: NORVASC  Take 10 mg by mouth once daily.     dextroamphetamine-amphetamine 30 MG 24 hr capsule  Commonly known as: ADDERALL XR  Take 30 mg by mouth.     DULoxetine 60 MG capsule  Commonly known as: CYMBALTA  Take 60 mg by  mouth once daily.     esomeprazole 40 MG capsule  Commonly known as: NEXIUM  Take 1 capsule (40 mg total) by mouth once daily.     furosemide 40 MG tablet  Commonly known as: LASIX  Take 40 mg by mouth.     hydroCHLOROthiazide 25 MG tablet  Commonly known as: HYDRODIURIL  Take 25 mg by mouth.     KlonoPIN 1 mg tablet  Generic drug: clonazePAM  Take 1 mg by mouth 2 (two) times daily as needed.     losartan 100 MG tablet  Commonly known as: COZAAR  Take 100 mg by mouth once daily.     ondansetron 4 MG tablet  Commonly known as: ZOFRAN  Take 4 mg by mouth.     promethazine 25 MG tablet  Commonly known as: PHENERGAN  Take 25 mg by mouth.     traZODone 150 MG tablet  Commonly known as: DESYREL  Take 300 mg by mouth every evening.              Indwelling Lines/Drains at time of discharge:   Lines/Drains/Airways       None                   Time spent on the discharge of patient: 36 minutes    Patient Screened for food insecurity, housing instability, transportation needs, utility difficulties, and interpersonal safety.  No needs identified     Physical Exam  Constitutional:       General: She is not in acute distress.     Appearance: Normal appearance. She is normal weight. She is not ill-appearing.   Cardiovascular:      Rate and Rhythm: Normal rate and regular rhythm.      Pulses: Normal pulses.      Heart sounds: Normal heart sounds.   Pulmonary:      Effort: Pulmonary effort is normal.      Breath sounds: Normal breath sounds.   Abdominal:      General: Abdomen is flat.      Palpations: Abdomen is soft.   Skin:     General: Skin is warm and dry.      Findings: No erythema, lesion or rash.   Neurological:      Mental Status: She is alert.   Psychiatric:         Behavior: Behavior normal.      Comments: I had a face to face encounter with this patient prior to d/c         Sheeba Galindo MD  Department of Hospital Medicine  [facility]/Surg

## 2025-05-23 NOTE — PLAN OF CARE
05/23/25 0841   Final Note   Assessment Type Final Discharge Note   Anticipated Discharge Disposition Home   What phone number can be called within the next 1-3 days to see how you are doing after discharge? [phone]   Hospital Resources/Appts/Education Provided Appointments scheduled and added to AVS   Post-Acute Status   Discharge Delays None known at this time

## 2025-05-23 NOTE — ED PROVIDER NOTES
Encounter Date: 5/23/2025       History     Chief Complaint   Patient presents with    Vomiting    Nausea     Reports she has cyclic vomiting and was admitted here on Wednesday and released yesterday and she started vomiting again this morning. Reports she threw up on the way here.      49 year old female presents with a repeat visit due to cyclitic vomiting.  Patient has had a recent hospitalization for same and seen in the ER on Wednesday for same.   History of using THC gummi for nausea that usually occurred for a week at a time.     The history is provided by the patient. No  was used.     Review of patient's allergies indicates:  No Known Allergies  Past Medical History:   Diagnosis Date    Anxiety disorder, unspecified     Depression     Fibromyalgia syndrome     Gastro-esophageal reflux disease without esophagitis     Hypertension      Past Surgical History:   Procedure Laterality Date    knee sx      SINUS SURGERY       No family history on file.  Social History[1]  Review of Systems   Gastrointestinal:  Positive for nausea and vomiting.   All other systems reviewed and are negative.      Physical Exam     Initial Vitals [05/23/25 1256]   BP Pulse Resp Temp SpO2   (!) 146/87 106 20 98.5 °F (36.9 °C) 98 %      MAP       --         Physical Exam    Nursing note and vitals reviewed.  Constitutional: She appears well-developed and well-nourished.   HENT:   Head: Normocephalic and atraumatic.   Eyes: Conjunctivae and EOM are normal. Pupils are equal, round, and reactive to light.   Neck: Neck supple.   Normal range of motion.  Cardiovascular:  Normal rate, regular rhythm, normal heart sounds and intact distal pulses.           Pulmonary/Chest: Breath sounds normal.   Abdominal: Abdomen is soft and flat. Bowel sounds are normal.   Musculoskeletal:         General: Normal range of motion.      Cervical back: Normal range of motion and neck supple.     Neurological: She is alert and oriented to  person, place, and time. She has normal strength.   Skin: Skin is warm and dry. Capillary refill takes less than 2 seconds.   Psychiatric: She has a normal mood and affect. Her behavior is normal. Judgment and thought content normal.         ED Course   Procedures  Labs Reviewed   COMPREHENSIVE METABOLIC PANEL - Abnormal       Result Value    Sodium 134 (*)     Potassium 2.8 (*)     Chloride 100      CO2 26      Glucose 106      Blood Urea Nitrogen 17      Creatinine 0.81      Calcium 9.5      Protein Total 8.0      Albumin 5.1 (*)     Globulin 2.9      Albumin/Globulin Ratio 1.8      Bilirubin Total 1.5 (*)     ALP 75      ALT 33      AST 35      eGFR 90      Anion Gap 8.0      BUN/Creatinine Ratio 21 (*)    CBC WITH DIFFERENTIAL - Abnormal    WBC 16.40 (*)     RBC 4.73      Hgb 14.6      Hct 41.3      MCV 87.3      MCH 30.9      MCHC 35.4      RDW 12.9      Platelet 373 (*)     MPV 8.0 (*)     Neut % 68.3      Lymph % 20.4      Mono % 9.8      Eos % 0.5 (*)     Basophil % 0.6      Lymph # 3.34      Neut # 11.20 (*)     Mono # 1.60 (*)     Eos # 0.09      Baso # 0.10 (*)     Imm Gran # 0.07 (*)     Imm Grans % 0.4      NRBC% 0.0     DRUG SCREEN, URINE (BEAKER) - Abnormal    Amphetamines, Urine Negative      Barbiturates, Urine Negative      Benzodiazepine, Urine Negative      Cannabinoids, Urine Positive (*)     Cocaine, Urine Negative      Opiates, Urine Negative      Phencyclidine, Urine Negative      Methadone, Urine Negative      pH, Urine 6.0      Narrative:     Cut off concentrations:    Amphetamines - 1000 ng/ml  Barbiturates - 200 ng/ml  Benzodiazepine - 200 ng/ml  Cannabinoids (THC) - 50 ng/ml  Cocaine - 300 ng/ml  Fentanyl - 1.0 ng/ml  MDMA - 500 ng/ml  Opiates - 300 ng/ml   Phencyclidine (PCP) - 25 ng/ml  Methadone - 300 ng/ml      False negatives may result form substances such as bleach added to urine.  False positives may result for the presence of a substance with similar chemical structure to the  drug or its metabolite.    This test provides only a PRELIMINARY analytical test result. A more specific alternate chemical method must be used in order to obtain a confirmed analytical result. Gas chromatography/mass spectrometry (GC/MS) is the preferred confirmatory method. Other chemical confirmation methods are available. Clinical consideration and professional judgement should be applied to any drug of abuse test result, particularly when preliminary positive results are used.    Positive results will be confirmed only at the physicians request. Unconfirmed screening results are to be used only for medical purposes (treatment).          MAGNESIUM - Normal    Magnesium Level 1.80     LIPASE - Normal    Lipase Level 228     URINALYSIS, REFLEX TO URINE CULTURE - Normal    Color, UA Yellow      Appearance, UA Clear      Specific Gravity, UA 1.015      pH, UA 6.0      Protein, UA Negative      Glucose, UA Negative      Ketones, UA Negative      Blood, UA Negative      Bilirubin, UA Negative      Urobilinogen, UA 0.2      Nitrites, UA Negative      Leukocyte Esterase, UA Negative      Narrative:      URINE STABILITY IS 2 HOURS AT ROOM TEMP OR    SIX HOURS REFRIGERATED. PERFORMING TESTING ON    SPECIMENS GREATER THAN THIS AGE MAY AFFECT THE    FOLLOWING TESTS:    PH          SPECIFIC GRAVITY           BLOOD    CLARITY     BILIRUBIN               UROBILINOGEN   CBC W/ AUTO DIFFERENTIAL    Narrative:     The following orders were created for panel order CBC auto differential.  Procedure                               Abnormality         Status                     ---------                               -----------         ------                     CBC with Differential[[id number]]       Abnormal            Final result                 Please view results for these tests on the individual orders.          Imaging Results    None          Medications   promethazine (PHENERGAN) 25 mg in 0.9% NaCl 50 mL IVPB (25 mg  Intravenous Incomplete 5/23/25 1540)   sodium chloride 0.9% bolus 1,000 mL 1,000 mL (0 mLs Intravenous Stopped 5/23/25 1532)   aluminum-magnesium hydroxide-simethicone 200-200-20 mg/5 mL suspension 30 mL (30 mLs Oral Given 5/23/25 1332)     And   LIDOcaine viscous HCl 2% oral solution 15 mL (15 mLs Oral Given 5/23/25 1332)   prochlorperazine injection Soln 10 mg (10 mg Intravenous Given 5/23/25 1329)   potassium chloride 10 mEq in 100 mL IVPB (0 mEq Intravenous Stopped 5/23/25 1606)   potassium chloride 10 mEq in 100 mL IVPB (0 mEq Intravenous Stopped 5/23/25 1606)   potassium chloride SA CR tablet 40 mEq (40 mEq Oral Given 5/23/25 1453)     Medical Decision Making  Consulted with Dr. Hurd on patient labs and condition.  POC discussed and agreed upon.  Vomiting has resolved but stated she remain nauseated.  Patient refused the infusion of phenergan and the 2nd dose of potassium.  She left AMA.     Problems Addressed:  Nausea and vomiting, unspecified vomiting type: acute illness or injury    Amount and/or Complexity of Data Reviewed  Labs: ordered.    Risk  OTC drugs.  Prescription drug management.                                      Clinical Impression:  Final diagnoses:  [R11.2] Nausea and vomiting, unspecified vomiting type (Primary)          ED Disposition Condition    AMDELROY                     Slime Adams FNP  05/23/25 1612         [1]   Social History  Tobacco Use    Smoking status: Never    Smokeless tobacco: Never   Substance Use Topics    Alcohol use: Not Currently    Drug use: Yes     Types: Marijuana        Slime Adams FNP  05/23/25 1618

## 2025-05-23 NOTE — LETTER
Patient: Christal Dixon  YOB: 1975  Date: 5/23/2025 Time: 4:06 PM  Location: [facility]    Leaving the Hospital Against Medical Advice    Chart #:[id number]    This will certify that I, the undersigned,    ______________________________________________________________________    A patient in the above named medical center, having requested discharge and removal from the medical center against the advice of my attending physician(s), hereby release [facility], its physicians, officers and employees, severally and individually, from any and all liability of any nature whatsoever for any injury or harm or complication of any kind that may result directly or indirectly, by reason of my terminating my stay as a patient at [facility] and my departure from [facility], and hereby waive any and all rights of action I may now have or later acquire as a result of my voluntary departure from [facility] and the termination of my stay as a patient therein.    This release is made with the full knowledge of the danger that may result from the action which I am taking.      Date:_______________________                         ___________________________                                                                                    Patient/Legal Representative    Witness:        ____________________________                          ___________________________  Nurse                                                                        Physician

## 2025-05-26 ENCOUNTER — PATIENT OUTREACH (OUTPATIENT)
Dept: ADMINISTRATIVE | Facility: CLINIC | Age: 50
End: 2025-05-26
Payer: COMMERCIAL

## 2025-05-26 NOTE — PROGRESS NOTES
C3 nurse attempted to contact Christal Dixon for a TCC post hospital discharge follow up call. No answer. The patient has a scheduled HOSFU appointment with Sebastián Munoz MD (Family Medicine) on 5/28/2025; @ 1pm     .

## 2025-07-11 ENCOUNTER — HOSPITAL ENCOUNTER (OUTPATIENT)
Dept: RADIOLOGY | Facility: HOSPITAL | Age: 50
Discharge: HOME | End: 2025-07-11
Attending: OBSTETRICS & GYNECOLOGY
Payer: COMMERCIAL

## 2025-07-11 DIAGNOSIS — Z12.31 SCREENING MAMMOGRAM, ENCOUNTER FOR: ICD-10-CM

## 2025-07-11 PROCEDURE — 77067 SCR MAMMO BI INCL CAD: CPT | Mod: 26,,, | Performed by: STUDENT IN AN ORGANIZED HEALTH CARE EDUCATION/TRAINING PROGRAM

## 2025-07-11 PROCEDURE — 77063 BREAST TOMOSYNTHESIS BI: CPT | Mod: TC

## 2025-07-11 PROCEDURE — 77063 BREAST TOMOSYNTHESIS BI: CPT | Mod: 26,,, | Performed by: STUDENT IN AN ORGANIZED HEALTH CARE EDUCATION/TRAINING PROGRAM
